# Patient Record
Sex: FEMALE | Race: BLACK OR AFRICAN AMERICAN | NOT HISPANIC OR LATINO | Employment: OTHER | URBAN - METROPOLITAN AREA
[De-identification: names, ages, dates, MRNs, and addresses within clinical notes are randomized per-mention and may not be internally consistent; named-entity substitution may affect disease eponyms.]

---

## 2017-07-03 ENCOUNTER — GENERIC CONVERSION - ENCOUNTER (OUTPATIENT)
Dept: OTHER | Facility: OTHER | Age: 72
End: 2017-07-03

## 2017-10-31 ENCOUNTER — ALLSCRIPTS OFFICE VISIT (OUTPATIENT)
Dept: OTHER | Facility: OTHER | Age: 72
End: 2017-10-31

## 2017-10-31 DIAGNOSIS — E03.9 HYPOTHYROIDISM: ICD-10-CM

## 2017-10-31 DIAGNOSIS — E55.9 VITAMIN D DEFICIENCY: ICD-10-CM

## 2017-10-31 DIAGNOSIS — E28.39 OTHER PRIMARY OVARIAN FAILURE: ICD-10-CM

## 2017-11-01 NOTE — PROGRESS NOTES
Assessment  1  Hypothyroidism in adult (244 9) (E03 9)   2  Vitamin D deficiency (268 9) (E55 9)    Plan  Encounter for screening for osteoporosis    · * DXA BONE DENSITY SPINE HIP AND PELVIS; Status:Complete;   Done: [id number]  12:00AM  Hypothyroidism in adult    · (1) COMPREHENSIVE METABOLIC PANEL; Status:Active; Requested for:31Oct2017;    · (1) TSH; Status:Active; Requested for:31Oct2017;    · Follow-up visit in 6 months Evaluation and Treatment  Follow-up  Status: Hold For -  Scheduling  Requested for: 31Oct2017  Menopause ovarian failure    · * DXA BONE DENSITY SPINE HIP AND PELVIS; Status:Hold For - Scheduling; Requested  for:31Oct2017;   Visit for screening mammogram    · * MAMMO SCREENING BILATERAL W CAD; Status:Complete;   Done: [id number]  12:00AM  Vitamin D deficiency    · Vitamin D 2000 UNIT Oral Capsule; take 1 capsule daily   · (1) VITAMIN D 25-HYDROXY; Status:Active; Requested for:31Oct2017;     Discussion/Summary  Discussion Summary:   Continue current medicationsobtain old records  Counseling Documentation With Imm: The patient was counseled regarding diagnostic results,-- instructions for management,-- prognosis  Chief Complaint  Chief Complaint Free Text Note Form: Pt  in office today for a new patient visit  Pt would like a blood work order  History of Present Illness  HPI: patient seen today as a new patient, she is taking synthroid, without any problems  Vitamin D Deficiency: Disease type: vitamin D deficiency  Current treatment includes vitamin D3 (cholecalciferol)  The patient is currently asymptomatic  Review of Systems  Complete-Female:   Constitutional: No fever, no chills, feels well, no tiredness, no recent weight gain or weight loss  Eyes: No complaints of eye pain, no red eyes, no eyesight problems, no discharge, no dry eyes, no itching of eyes  ENT: no complaints of earache, no loss of hearing, no nose bleeds, no nasal discharge, no sore throat, no hoarseness  Cardiovascular: No complaints of slow heart rate, no fast heart rate, no chest pain, no palpitations, no leg claudication, no lower extremity edema  Respiratory: No complaints of shortness of breath, no wheezing, no cough, no SOB on exertion, no orthopnea, no PND  Gastrointestinal: No complaints of abdominal pain, no constipation, no nausea or vomiting, no diarrhea, no bloody stools  Genitourinary: No complaints of dysuria, no incontinence, no pelvic pain, no dysmenorrhea, no vaginal discharge or bleeding  Musculoskeletal: No complaints of arthralgias, no myalgias, no joint swelling or stiffness, no limb pain or swelling  Integumentary: No complaints of skin rash or lesions, no itching, no skin wounds, no breast pain or lump  Neurological: No complaints of headache, no confusion, no convulsions, no numbness, no dizziness or fainting, no tingling, no limb weakness, no difficulty walking  Psychiatric: Not suicidal, no sleep disturbance, no anxiety or depression, no change in personality, no emotional problems  Endocrine: No complaints of proptosis, no hot flashes, no muscle weakness, no deepening of the voice, no feelings of weakness  Hematologic/Lymphatic: No complaints of swollen glands, no swollen glands in the neck, does not bleed easily, does not bruise easily  Active Problems  1  Encounter for screening for osteoporosis (V82 81) (Z58 977)   2  Visit for screening mammogram (V76 12) (Z12 31)    Surgical History  1  History of Gallbladder Surgery    Family History  Mother    1  Family history of cerebrovascular accident (CVA) (V17 1) (Z82 3)   2  Family history of diabetes mellitus (V18 0) (Z83 3)  Father    3  Family history of cardiac disorder (V17 49) (Z82 49)  Sister    4  Family history of malignant neoplasm (V16 9) (Z80 9)   5  Denied: Family history of mental disorder   6  Denied: Family history of substance abuse   7   Family history of thyroid disease (V18 19) (Z83 49)  Grandfather 8  Denied: Family history of mental disorder   9  Denied: Family history of substance abuse   10  Family history of thyroid disease (V18 19) (Z83 49)    Social History   · Always uses seat belt   · Does not use caffeine (V15 89) (Z91 89)   · Does not use illicit drugs (J62 12) (Z78 9)   · Exercises regularly   · Lives with spouse   ·    · Never a smoker   · No alcohol use   · Primary spoken language English   · Retired    Current Meds   1  Elidel 1 % External Cream;   Therapy: (Recorded:31Oct2017) to Recorded   2  Levocetirizine Dihydrochloride 5 MG Oral Tablet; Therapy: (Recorded:31Oct2017) to Recorded   3  Synthroid 88 MCG Oral Tablet; Therapy: (Recorded:31Oct2017) to Recorded   4  Vitamin D3 CAPS; Therapy: (Recorded:31Oct2017) to Recorded    Allergies  1  Levaquin TABS    Vitals  Vital Signs    Recorded: [id number] 02:09PM   Heart Rate 95   Systolic 460   Diastolic 90   Height 5 ft 4 in   Weight 235 lb    BMI Calculated 40 34   BSA Calculated 2 09   O2 Saturation 97     Physical Exam    Constitutional   General appearance: No acute distress, well appearing and well nourished  Ears, Nose, Mouth, and Throat   Oropharynx: Normal with no erythema, edema, exudate or lesions  Pulmonary   Auscultation of lungs: Clear to auscultation  Cardiovascular   Auscultation of heart: Normal rate and rhythm, normal S1 and S2, without murmurs  Examination of extremities for edema and/or varicosities: Normal     Abdomen   Abdomen: Non-tender, no masses  Musculoskeletal   Gait and station: Normal     Neurologic   Cranial nerves: Cranial nerves 2-12 intact      Psychiatric   Orientation to person, place, and time: Normal     Mood and affect: Normal          Results/Data  * MAMMO SCREENING BILATERAL W CAD 19Oct2017 12:00AM Shawn Arevalo     Test Name Result Flag Reference   MAMMO SCREENING BILATERAL W CAD 10/19/2017       Summary / No summary entered :      No summary entered  Documents attached : sMammo - Wanda Marcelo; Enc: [id number] - Appointment - Wanda Marcelo - (Family      Medicine) (Result Document)  * DXA BONE DENSITY SPINE HIP AND PELVIS [id number] 12:00AM Wanda Marcelo     Test Name Result Flag Reference   DXA BONE DENSITY SPINE HIP AND PELVIS 07/20/2015       Summary / No summary entered :      No summary entered  Documents attached :      Elyse Cai; Enc: [id number] - Appointment - Wanda Marcelo - (Family Medicine)      (Result Document)    Future Appointments    Date/Time Provider Specialty Site   11/13/2017 01:00 PM REGGIE Arvizu   Bariatric Medicine [facility]     Signatures   Electronically signed by : Kelly Valle DO; Oct 31 2017  2:32PM EST                       (Author)

## 2017-11-07 ENCOUNTER — LAB CONVERSION - ENCOUNTER (OUTPATIENT)
Dept: OTHER | Facility: OTHER | Age: 72
End: 2017-11-07

## 2017-11-07 ENCOUNTER — GENERIC CONVERSION - ENCOUNTER (OUTPATIENT)
Dept: OTHER | Facility: OTHER | Age: 72
End: 2017-11-07

## 2017-11-07 LAB
25(OH)D3 SERPL-MCNC: 32 NG/ML (ref 30–100)
A/G RATIO (HISTORICAL): 1.2 (CALC) (ref 1–2.5)
ALBUMIN SERPL BCP-MCNC: 3.9 G/DL (ref 3.6–5.1)
ALP SERPL-CCNC: 84 U/L (ref 33–130)
ALT SERPL W P-5'-P-CCNC: 12 U/L (ref 6–29)
AST SERPL W P-5'-P-CCNC: 13 U/L (ref 10–35)
BILIRUB SERPL-MCNC: 0.4 MG/DL (ref 0.2–1.2)
BUN SERPL-MCNC: 14 MG/DL (ref 7–25)
BUN/CREA RATIO (HISTORICAL): NORMAL (CALC) (ref 6–22)
CALCIUM SERPL-MCNC: 9.1 MG/DL (ref 8.6–10.4)
CHLORIDE SERPL-SCNC: 105 MMOL/L (ref 98–110)
CO2 SERPL-SCNC: 26 MMOL/L (ref 20–31)
CREAT SERPL-MCNC: 0.81 MG/DL (ref 0.6–0.93)
EGFR AFRICAN AMERICAN (HISTORICAL): 85 ML/MIN/1.73M2
EGFR-AMERICAN CALC (HISTORICAL): 74 ML/MIN/1.73M2
GAMMA GLOBULIN (HISTORICAL): 3.3 G/DL (CALC) (ref 1.9–3.7)
GLUCOSE (HISTORICAL): 94 MG/DL (ref 65–99)
POTASSIUM SERPL-SCNC: 4.3 MMOL/L (ref 3.5–5.3)
SODIUM SERPL-SCNC: 138 MMOL/L (ref 135–146)
TOTAL PROTEIN (HISTORICAL): 7.2 G/DL (ref 6.1–8.1)
TSH SERPL DL<=0.05 MIU/L-ACNC: 0.93 MIU/L (ref 0.4–4.5)

## 2017-12-15 ENCOUNTER — ALLSCRIPTS OFFICE VISIT (OUTPATIENT)
Dept: OTHER | Facility: OTHER | Age: 72
End: 2017-12-15

## 2018-01-14 VITALS
OXYGEN SATURATION: 97 % | HEIGHT: 64 IN | HEART RATE: 95 BPM | DIASTOLIC BLOOD PRESSURE: 90 MMHG | WEIGHT: 235 LBS | BODY MASS INDEX: 40.12 KG/M2 | SYSTOLIC BLOOD PRESSURE: 140 MMHG

## 2018-01-16 NOTE — RESULT NOTES
Verified Results  (1) COMPREHENSIVE METABOLIC PANEL [id number] 67:41CD Ben Dove     Test Name Result Flag Reference   GLUCOSE 94 mg/dL  65-99   Fasting reference interval   UREA NITROGEN (BUN) 14 mg/dL  7-25   CREATININE 0 81 mg/dL  0 60-0 93   For patients >52years of age, the reference limit  for Creatinine is approximately 13% higher for people  identified as -American  eGFR NON-AFR  AMERICAN 74 mL/min/1 73m2  > OR = 60   eGFR AFRICAN AMERICAN 85 mL/min/1 73m2  > OR = 60   BUN/CREATININE RATIO   1-53   NOT APPLICABLE (calc)   SODIUM 138 mmol/L  135-146   POTASSIUM 4 3 mmol/L  3 5-5 3   CHLORIDE 105 mmol/L     CARBON DIOXIDE 26 mmol/L  20-31   CALCIUM 9 1 mg/dL  8 6-10 4   PROTEIN, TOTAL 7 2 g/dL  6 1-8 1   ALBUMIN 3 9 g/dL  3 6-5 1   GLOBULIN 3 3 g/dL (calc)  1 9-3 7   ALBUMIN/GLOBULIN RATIO 1 2 (calc)  1 0-2 5   BILIRUBIN, TOTAL 0 4 mg/dL  0 2-1 2   ALKALINE PHOSPHATASE 84 U/L     AST 13 U/L  10-35   ALT 12 U/L  6-29     *(Q) VITAMIN D, 25-HYDROXY, LC/MS/MS [id number] 11:57AM Russ Solitario   REPORT COMMENT:  FASTING:YES     Test Name Result Flag Reference   VITAMIN D, 25-OH, TOTAL 32 ng/mL     Vitamin D Status         25-OH Vitamin D:     Deficiency:                    <20 ng/mL  Insufficiency:             20 - 29 ng/mL  Optimal:                 > or = 30 ng/mL     For 25-OH Vitamin D testing on patients on   D2-supplementation and patients for whom quantitation   of D2 and D3 fractions is required, the QuestAssureD(TM)  25-OH VIT D, (D2,D3), LC/MS/MS is recommended: order   code 41120 (patients >2yrs)  For more information on this test, go to:  [email or web address]/faq/QEZ176  (This link is being provided for   informational/educational purposes only )     (Q) TSH, 3RD GENERATION [id number] 11:57AM Ben Dove     Test Name Result Flag Reference   TSH 0 93 mIU/L  0 40-4 50

## 2018-01-23 VITALS
HEART RATE: 72 BPM | RESPIRATION RATE: 16 BRPM | HEIGHT: 65 IN | BODY MASS INDEX: 38.87 KG/M2 | TEMPERATURE: 98.9 F | DIASTOLIC BLOOD PRESSURE: 92 MMHG | SYSTOLIC BLOOD PRESSURE: 150 MMHG | WEIGHT: 233.3 LBS

## 2018-01-23 NOTE — CONSULTS
Chief Complaint  Chief Complaint Free Text Note Form: New patient here for MWM consult; Francisco Rasmussen 5/8  History of Present Illness  Free Text HPI: Obesity-  Severity: Severe  Onset: menopause  Modifiers: depression (has denied tx in the past, she reports that it seems to be improving and denies SI/HI)  Was able to lose weight in the past with Renelda Cancel, but gained the weight back after her son passed away and her  had health issues  Had also been successful with [employer], [place] weight loss, and [employer]  Also joined [employer] support  Associated Symptoms: affects walking and joints, self image  Goals: below 200 lbs, but more specifically weigh 160 lbs    Seldom drinks water  Drinks diluted hanson's grape fruit (1 glass)  Drinks 1-2 cups decaf coffee with sweet and low and half and half  B: skips for the most part or batista, eggs, grits, and biscuits or cereal (skim milk)  S: fruit (bananas or grapes), sometimes fritos  L: skips  S: sometimes has miniature candy   D: vegetable/salad + macaroni/coleslaw + starch + protein  S: pecan Jordanian or ice cream       Past Medical History    1  History of Colon cancer screening (V76 51) (Z12 11)   2  History of Encounter for screening for osteoporosis (V82 81) (Z13 820)   3  History of screening mammography (V15 89) (Z92 89)   4  History of Screening for genitourinary condition (V81 6) (Z13 89)  Active Problems And Past Medical History Reviewed: The active problems and past medical history were reviewed and updated today  Assessment    1  Severe obesity (BMI 35 0-39 9) (278 01) (E66 01)   2  Hypothyroidism in adult (244 9) (E03 9)   3  Vitamin D deficiency (268 9) (E55 9)   4  Elevated blood pressure reading (796 2) (R03 0)    Plan   1  (1) INSULIN, FASTING; Status:Active; Requested for:40Iaq8430;    2  (1) LIPID PANEL, FASTING; Status:Active;  Requested for:05Srk9758;     Discussion/Summary  Discussion Summary:   70 yo female w/hypothyroidism, vitamin-D deficiency, +STOPBANG, and morbid obesity here to pursue medical weight management to improve weight and health  Obesity class 3:  -discussed options of conservative vs YUDI program +/- meal replacement vs VLCD  -discussed the indications for bariatric surgery -- pt not interested  -discussed the role of weight loss medications  -initial focus of 5-10% weight loss over 3-6 mos for improved health  -screening labs: CMP and TSH within acceptable limits  Check fasting insulin and lipid panel   -reviewed recommended BMI of 23-30 for individuals age 72 and older    Elevated BP:   -reviewed the risk of untreated high blood pressure   -recommended the patient check her blood pressure when she gets home  If still elevated she should call her family doctor  Hypothyroidism:  -continue Synthroid as per prescribing provider  -reports that she has been experiencing brittle nails and hair loss  TSH within acceptable limits from November  Recommended she follow up with her PCP  Vitamin-D deficiency:  -within acceptable limits from November    +STOPBANG:  -reviewed the risks of untreated sleep apnea  -offered referral for sleep medicine consult, but patient declines  She would like to see if risks improve with weight loss  -reassess in 3-6 months  Soy product-patient reports she gets skin breakouts, but denies allergic reaction    The patient travels during the winter months  She is interested in healthy core and would like to be scheduled for start when she returns in March  For now she will start with a conservative program    Goals and Barriers: The patient has the current Goals:     Goals:  1  Food log www  [employer] <[email or web address]>  2  No sugary beverages  At least 64oz of water daily  3  Increase physical activity by 10 minutes daily  4  8614-5809 calories, see sample menu  5  no skipping meals  6  protein: 65-80 grams/day  Patient's Capacity to Self-Care: Patient is able to Self-Care     Understands and agrees with treatment plan: The treatment plan was reviewed with the patient/guardian  The patient/guardian understands and agrees with the treatment plan   Self Referrals:   Self Referrals: No      Review of Systems  Focused-Female:   Constitutional: no fever and no chills  ENT: no sore throat  Cardiovascular: no chest pain and no palpitations  Respiratory: no shortness of breath and no cough  Gastrointestinal: diarrhea and Denies GERD, but no abdominal pain, no nausea and no vomiting    The patient presents with complaints of constipation (occasionally alternates between constipation and diarrhea)  Genitourinary: no dysuria  Musculoskeletal: arthralgias  Integumentary: no rashes  Neurological: no headache  Other Symptoms: PSYCH: admits to depression, but seems to be improving  Denies SI/HI  Recommended f/u with PCP/counseling if sx persist/worsen  ROS Reviewed:   ROS reviewed  Active Problems    1  Hypothyroidism in adult (244 9) (E03 9)   2  Menopause ovarian failure (256 39) (E28 39)   3  Vitamin D deficiency (268 9) (E55 9)    Surgical History    1  History of Gallbladder Surgery  Surgical History Reviewed: The surgical history was reviewed and updated today  Family History  Mother    1  Family history of cerebrovascular accident (CVA) (V17 1) (Z82 3)   2  Family history of diabetes mellitus (V18 0) (Z83 3)  Father    3  Family history of cardiac disorder (V17 49) (Z82 49)  Sister    4  Family history of malignant neoplasm (V16 9) (Z80 9)   5  Denied: Family history of mental disorder   6  Denied: Family history of substance abuse   7  Family history of thyroid disease (V18 19) (Z83 49)   8  FHx: breast cancer (V16 3) (Z80 3)  Grandfather    9  Denied: Family history of mental disorder   10  Denied: Family history of substance abuse   11  Family history of thyroid disease (V18 19) (Z83 49)  Family History Reviewed: The family history was reviewed and updated today  Social History    · Always uses seat belt   · Does not use caffeine (V15 89) (Z91 89)   · Does not use illicit drugs (Q88 29) (Z78 9)   · Exercises regularly   · Lives with spouse   ·    · Never a smoker   · No alcohol use   · Primary spoken language English   · Retired  Social History Reviewed: The social history was reviewed and updated today  Current Meds   1  Elidel 1 % External Cream;   Therapy: (Recorded:31Oct2017) to Recorded   2  Levocetirizine Dihydrochloride 5 MG Oral Tablet; Therapy: (Recorded:31Oct2017) to Recorded   3  Synthroid 88 MCG Oral Tablet; Therapy: (Recorded:31Oct2017) to Recorded   4  Vitamin D 2000 UNIT Oral Capsule; take 1 capsule daily; Therapy: [id number] to (Last Rx:31Oct2017) Ordered  Medication List Reviewed: The medication list was reviewed and updated today  Allergies    1  iodine   2  Levaquin TABS    3  Shellfish   4  Soy    Vitals  Vital Signs    Recorded: 15Dec2017 11:05AM Recorded: 15Dec2017 10:11AM   Temperature  98 9 F   Heart Rate  72   Respiration  16   Systolic 767 244   Diastolic 92 84   Height  5 ft 4 5 in   Weight  233 lb 4 8 oz   BMI Calculated  39 43   BSA Calculated  2 1   Waist Circumference  45 75   Neck Circumference  15     Physical Exam    Constitutional   General appearance: Abnormal   well developed and obese  Eyes No conjunctival pallor  Ears, Nose, Mouth, and Throat Oral mucosa moist    Pulmonary   Respiratory effort: No increased work of breathing or signs of respiratory distress  Auscultation of lungs: Clear to auscultation  Cardiovascular   Auscultation of heart: Normal rate and rhythm, normal S1 and S2, without murmurs  Examination of extremities for edema and/or varicosities: Normal   no edema  Abdomen   Abdomen: Abnormal   The abdomen was obese  Bowel sounds were normal  The abdomen was soft and nontender     Musculoskeletal   Gait and station: Normal     Psychiatric   Orientation to person, place, and time: Normal          Future Appointments    Date/Time Provider Specialty Site   03/15/2018 11:00 AM Juan Manuel Herbertweg  [facility]   05/01/2018 02:00 PM Tonio Shaver  Family Medicine [address]     Signatures   Electronically signed by : Merrily Lefort, [facility]; Dec 15 2017 12:35PM EST                       (Author)    Electronically signed by : REGGIE Olvera ; Dec 18 2017  7:32AM EST                       (Co-author)

## 2018-03-08 DIAGNOSIS — E03.9 ADULT HYPOTHYROIDISM: Primary | ICD-10-CM

## 2018-03-08 RX ORDER — LEVOTHYROXINE SODIUM 88 UG/1
88 TABLET ORAL DAILY
Qty: 90 TABLET | Refills: 0 | Status: SHIPPED | OUTPATIENT
Start: 2018-03-08 | End: 2018-06-11 | Stop reason: SDUPTHER

## 2018-03-08 RX ORDER — LEVOTHYROXINE SODIUM 88 UG/1
TABLET ORAL
COMMUNITY
End: 2018-03-08 | Stop reason: SDUPTHER

## 2018-03-20 ENCOUNTER — OFFICE VISIT (OUTPATIENT)
Dept: FAMILY MEDICINE CLINIC | Facility: CLINIC | Age: 73
End: 2018-03-20
Payer: MEDICARE

## 2018-03-20 VITALS
SYSTOLIC BLOOD PRESSURE: 136 MMHG | OXYGEN SATURATION: 97 % | BODY MASS INDEX: 36.8 KG/M2 | TEMPERATURE: 97.7 F | DIASTOLIC BLOOD PRESSURE: 82 MMHG | HEIGHT: 66 IN | HEART RATE: 66 BPM | WEIGHT: 229 LBS | RESPIRATION RATE: 15 BRPM

## 2018-03-20 DIAGNOSIS — J40 BRONCHITIS: Primary | ICD-10-CM

## 2018-03-20 PROBLEM — E55.9 VITAMIN D DEFICIENCY: Status: ACTIVE | Noted: 2017-10-31

## 2018-03-20 PROBLEM — E28.39 MENOPAUSE OVARIAN FAILURE: Status: ACTIVE | Noted: 2017-10-31

## 2018-03-20 PROBLEM — E03.9 HYPOTHYROIDISM IN ADULT: Status: ACTIVE | Noted: 2017-10-31

## 2018-03-20 PROCEDURE — 99213 OFFICE O/P EST LOW 20 MIN: CPT | Performed by: FAMILY MEDICINE

## 2018-03-20 RX ORDER — LEVOCETIRIZINE DIHYDROCHLORIDE 5 MG/1
TABLET, FILM COATED ORAL
COMMUNITY
End: 2018-11-12 | Stop reason: SDUPTHER

## 2018-03-20 RX ORDER — PIMECROLIMUS 10 MG/G
CREAM TOPICAL
COMMUNITY
End: 2018-05-01 | Stop reason: SDUPTHER

## 2018-03-20 RX ORDER — AZITHROMYCIN 250 MG/1
TABLET, FILM COATED ORAL
Qty: 6 TABLET | Refills: 0 | Status: SHIPPED | OUTPATIENT
Start: 2018-03-20 | End: 2018-03-24

## 2018-03-20 RX ORDER — LEVOTHYROXINE SODIUM 88 UG/1
TABLET ORAL
COMMUNITY
End: 2018-03-20

## 2018-03-20 NOTE — PROGRESS NOTES
Assessment/Plan:       Diagnoses and all orders for this visit:    Bronchitis  -     azithromycin (ZITHROMAX) 250 mg tablet; Take 2 tablets today then 1 tablet daily x 4 days    Other orders  -     Cholecalciferol 2000 units CAPS; Take 1 capsule by mouth  -     pimecrolimus (ELIDEL) 1 % cream; Apply topically  -     levocetirizine (XYZAL) 5 MG tablet; Take by mouth  -     Discontinue: levothyroxine (SYNTHROID) 88 mcg tablet; 1 TABLET DAILY              Subjective:      Patient ID: Ayush Terry is a 71 y o  female  Cough   This is a new problem  The current episode started 1 to 4 weeks ago  The problem has been gradually worsening  The problem occurs constantly  The cough is productive of sputum  Pertinent negatives include no chest pain, chills, ear pain, rhinorrhea or sore throat  She has tried OTC cough suppressant for the symptoms  The treatment provided no relief  The following portions of the patient's history were reviewed and updated as appropriate:   She has no past medical history on file  ,   does not have any pertinent problems on file  ,   has no past surgical history on file  ,  family history is not on file  ,   reports that she has never smoked  She has never used smokeless tobacco  She reports that she drinks alcohol  She reports that she does not use drugs  ,  is allergic to iodine; isoflavones; levofloxacin; and shellfish allergy     Current Outpatient Prescriptions   Medication Sig Dispense Refill    azithromycin (ZITHROMAX) 250 mg tablet Take 2 tablets today then 1 tablet daily x 4 days 6 tablet 0    Cholecalciferol 2000 units CAPS Take 1 capsule by mouth      levocetirizine (XYZAL) 5 MG tablet Take by mouth      levothyroxine (SYNTHROID) 88 mcg tablet Take 1 tablet (88 mcg total) by mouth daily 90 tablet 0    pimecrolimus (ELIDEL) 1 % cream Apply topically       No current facility-administered medications for this visit  Review of Systems   Constitutional: Negative for chills  HENT: Negative for ear pain, rhinorrhea, sinus pain and sore throat  Eyes: Negative for pain  Respiratory: Positive for cough  Cardiovascular: Negative for chest pain  Gastrointestinal: Negative for abdominal pain  Genitourinary: Negative for difficulty urinating  Objective:  Vitals:    03/20/18 1128   BP: 136/82   Pulse: 66   Resp: 15   Temp: 97 7 °F (36 5 °C)   SpO2: 97%   Weight: 104 kg (229 lb)   Height: 5' 6" (1 676 m)     Body mass index is 36 96 kg/m²  Physical Exam   Constitutional: She appears well-developed  HENT:   Head: Normocephalic  Nose: Mucosal edema, rhinorrhea and sinus tenderness present  Right sinus exhibits maxillary sinus tenderness and frontal sinus tenderness  Left sinus exhibits maxillary sinus tenderness and frontal sinus tenderness  Eyes: Conjunctivae are normal    Neck: Neck supple  Cardiovascular: Normal rate and regular rhythm  Pulmonary/Chest: No respiratory distress  She has wheezes  Abdominal: There is no tenderness  Lymphadenopathy:     She has no cervical adenopathy  Nursing note and vitals reviewed

## 2018-03-20 NOTE — PATIENT INSTRUCTIONS

## 2018-04-24 ENCOUNTER — HOSPITAL ENCOUNTER (OUTPATIENT)
Dept: BONE DENSITY | Facility: CLINIC | Age: 73
Discharge: HOME | End: 2018-04-24
Payer: MEDICARE

## 2018-04-24 DIAGNOSIS — E28.39 OTHER PRIMARY OVARIAN FAILURE: ICD-10-CM

## 2018-04-24 PROCEDURE — 77080 DXA BONE DENSITY AXIAL: CPT

## 2018-05-01 ENCOUNTER — OFFICE VISIT (OUTPATIENT)
Dept: FAMILY MEDICINE CLINIC | Facility: CLINIC | Age: 73
End: 2018-05-01
Payer: MEDICARE

## 2018-05-01 VITALS
HEART RATE: 69 BPM | RESPIRATION RATE: 16 BRPM | HEIGHT: 66 IN | WEIGHT: 231 LBS | BODY MASS INDEX: 37.12 KG/M2 | OXYGEN SATURATION: 94 % | TEMPERATURE: 98.5 F | DIASTOLIC BLOOD PRESSURE: 86 MMHG | SYSTOLIC BLOOD PRESSURE: 136 MMHG

## 2018-05-01 DIAGNOSIS — E55.9 VITAMIN D DEFICIENCY: ICD-10-CM

## 2018-05-01 DIAGNOSIS — E03.9 HYPOTHYROIDISM IN ADULT: Primary | ICD-10-CM

## 2018-05-01 DIAGNOSIS — L30.9 DERMATITIS: ICD-10-CM

## 2018-05-01 DIAGNOSIS — E78.2 MIXED HYPERLIPIDEMIA: ICD-10-CM

## 2018-05-01 PROCEDURE — 99214 OFFICE O/P EST MOD 30 MIN: CPT | Performed by: FAMILY MEDICINE

## 2018-05-01 RX ORDER — PIMECROLIMUS 10 MG/G
CREAM TOPICAL 2 TIMES DAILY
Qty: 30 G | Refills: 3 | Status: SHIPPED | OUTPATIENT
Start: 2018-05-01 | End: 2018-11-05 | Stop reason: SDUPTHER

## 2018-05-01 NOTE — PATIENT INSTRUCTIONS
Hyperlipidemia   WHAT YOU NEED TO KNOW:   What is hyperlipidemia? Hyperlipidemia is a high level of lipids (fats) in your blood  These lipids include cholesterol or triglycerides  Lipids are made by your body  They also come from the foods you eat  Your body needs lipids to work properly, but high levels increase your risk for heart disease, heart attack, and stroke  What increases my risk for hyperlipidemia? · Family history of high lipid levels    · Diet high in saturated fats, cholesterol, or calories     · High alcohol intake or smoking    · Lack of regular physical activity    · Medical conditions such as hypothyroidism, obesity, or type 2 diabetes    · Certain medicines, such as blood pressure medicines, hormones, and steroids  How is hyperlipidemia managed and treated? Your healthcare provider may first recommend that you make lifestyle changes to help decrease your lipid levels  You may also need to take medicine to lower your lipid levels  Some of the lifestyle changes you may need to make include the following:  · Maintain a healthy weight  Ask your healthcare provider how much you should weigh  Ask him or her to help you create a weight loss plan if you are overweight  Weight loss can decrease your cholesterol and triglyceride levels  · Exercise as directed  Exercise lowers your cholesterol levels and helps you maintain a healthy weight  Get 40 minutes or more of moderate exercise 3 to 4 days each week  You can split your exercise into four 10-minute workouts instead of 40 minutes at one time  Examples of moderate exercises include walking briskly, swimming, or riding a bike  Work with your healthcare provider to plan the best exercise program for you  · Do not smoke  Nicotine and other chemicals in cigarettes and cigars can increase your risk for a heart attack and stroke  Ask your healthcare provider for information if you currently smoke and need help to quit   E-cigarettes or smokeless tobacco still contain nicotine  Talk to your healthcare provider before you use these products  · Eat heart-healthy foods  Talk to your dietitian about a heart-healthy diet  The following will help you manage hyperlipidemia:     ¨ Decrease the total amount of fat you eat  Choose lean meats, fat-free or 1% fat milk, and low-fat dairy products, such as yogurt and cheese  Limit or do not eat red meat  Red meats are high in fat and cholesterol  ¨ Replace unhealthy fats with healthy fats  Unhealthy fats include saturated fat, trans fat, and cholesterol  Choose soft margarines that are low in saturated fat and have little or no trans fat  Monounsaturated fats are healthy fats  These are found in olive oil, canola oil, avocado, and nuts  Polyunsaturated fats are also healthy  These are found in fish, flaxseed, walnuts, and soybeans  ¨ Eat fruits and vegetables every day  They are low in calories and fat and a good source of essential vitamins  Include dark green, red, and orange vegetables  Examples include spinach, kale, broccoli, and carrots  ¨ Eat foods high in fiber  Choose whole grain, high-fiber foods  Good choices include whole-wheat breads or cereals, beans, peas, fruits, and vegetables  · Ask your healthcare provider if it is safe for you to drink alcohol  Alcohol can increase your cholesterol and triglyceride levels  A drink of alcohol is 12 ounces of beer, 5 ounces of wine, or 1½ ounces of liquor    Call 911 for any of the following:   · You have any of the following signs of a heart attack:      ¨ Squeezing, pressure, or pain in your chest that lasts longer than 5 minutes or returns    ¨ Discomfort or pain in your back, neck, jaw, stomach, or arm     ¨ Trouble breathing    ¨ Nausea or vomiting    ¨ Lightheadedness or a sudden cold sweat, especially with chest pain or trouble breathing    · You have any of the following signs of a stroke:      ¨ Numbness or drooping on one side of your face     ¨ Weakness in an arm or leg    ¨ Confusion or difficulty speaking    ¨ Dizziness, a severe headache, or vision loss  When should I contact my healthcare provider? · You have questions or concerns about your condition or care  CARE AGREEMENT:   You have the right to help plan your care  Learn about your health condition and how it may be treated  Discuss treatment options with your caregivers to decide what care you want to receive  You always have the right to refuse treatment  The above information is an  only  It is not intended as medical advice for individual conditions or treatments  Talk to your doctor, nurse or pharmacist before following any medical regimen to see if it is safe and effective for you  © 2017 [facility] Information is for End User's use only and may not be sold, redistributed or otherwise used for commercial purposes  All illustrations and images included in CareNotes® are the copyrighted property of [employer]  or [employer]

## 2018-05-01 NOTE — PROGRESS NOTES
Assessment/Plan:       Diagnoses and all orders for this visit:    Hypothyroidism in adult  -     TSH, 3rd generation; Future    Mixed hyperlipidemia  -     Cancel: Comprehensive metabolic panel; Future  -     CBC; Future  -     Lipid panel; Future  -     Comprehensive metabolic panel; Future    Vitamin D deficiency    Dermatitis  -     pimecrolimus (ELIDEL) 1 % cream; Apply topically 2 (two) times a day          Continue current medications, check a CMP and TSH now, and then a CBC CMP lipid profile in 6 months  We will see her back in 6 months time sooner she has any problems  Subjective:      Patient ID: Moshe Watkins is a 71 y o  female  Patient comes in today for a checkup  She is taking her levothyroxine for hypothyroidism, without any problems  No side effects  She had is concerned that her sugar may be elevated, although she has no signs or symptoms  She is concerned because she was reviewing her blood work and noted that her blood sugar was in the 90s  She is taking her vitamin D supplement without  problems, no side effects, no compliance issues  She does need refills of her Elidil for her dermatitis  The following portions of the patient's history were reviewed and updated as appropriate:   She has a past medical history of No known problems  ,   does not have any pertinent problems on file  ,   has a past surgical history that includes Gallbladder surgery  ,  family history includes Breast cancer in her sister; Cancer in her sister; Diabetes in her mother; Heart disease in her father; Stroke in her mother; Thyroid disease in her other and sister  ,   reports that she has never smoked  She has never used smokeless tobacco  She reports that she drinks alcohol  She reports that she does not use drugs  ,  is allergic to iodine; isoflavones; levofloxacin; and shellfish allergy     Current Outpatient Prescriptions   Medication Sig Dispense Refill    Cholecalciferol 2000 units CAPS Take 1 capsule by mouth      levocetirizine (XYZAL) 5 MG tablet Take by mouth      levothyroxine (SYNTHROID) 88 mcg tablet Take 1 tablet (88 mcg total) by mouth daily 90 tablet 0    pimecrolimus (ELIDEL) 1 % cream Apply topically 2 (two) times a day 30 g 3     No current facility-administered medications for this visit  Review of Systems   Constitutional: Negative for chills, fatigue and fever  HENT: Negative for congestion, ear pain, hearing loss, postnasal drip, rhinorrhea and sore throat  Eyes: Negative for pain and visual disturbance  Respiratory: Negative for chest tightness, shortness of breath and wheezing  Cardiovascular: Negative for chest pain and leg swelling  Gastrointestinal: Negative for abdominal distention, abdominal pain, constipation, diarrhea and vomiting  Endocrine: Negative for cold intolerance and heat intolerance  Genitourinary: Negative for difficulty urinating, frequency and urgency  Musculoskeletal: Negative for arthralgias and gait problem  Skin: Negative for color change  Neurological: Negative for dizziness, tremors, syncope, numbness and headaches  Hematological: Negative for adenopathy  Psychiatric/Behavioral: Negative for agitation, confusion and sleep disturbance  The patient is not nervous/anxious  Objective:  Vitals:    05/01/18 1407 05/01/18 1428   BP: 146/82 136/86   Pulse: 69    Resp: 16    Temp: 98 5 °F (36 9 °C)    SpO2: 94%    Weight: 105 kg (231 lb)    Height: 5' 6" (1 676 m)      Body mass index is 37 28 kg/m²  Physical Exam   Constitutional: She is oriented to person, place, and time  She appears well-developed and well-nourished  HENT:   Head: Normocephalic  Mouth/Throat: Oropharynx is clear and moist    Eyes: Conjunctivae are normal  No scleral icterus  Neck: Normal range of motion  No thyromegaly present  Cardiovascular: Normal rate, regular rhythm and normal heart sounds  No murmur heard    Pulmonary/Chest: Effort normal and breath sounds normal  No respiratory distress  She has no wheezes  Abdominal: Soft  Bowel sounds are normal  She exhibits no distension  There is no tenderness  Musculoskeletal: Normal range of motion  She exhibits no edema or tenderness  Lymphadenopathy:     She has no cervical adenopathy  Neurological: She is alert and oriented to person, place, and time  No cranial nerve deficit  Skin: Skin is warm and dry  No rash noted  No pallor  Psychiatric: She has a normal mood and affect  Her behavior is normal    Nursing note and vitals reviewed

## 2018-06-11 DIAGNOSIS — E03.9 ADULT HYPOTHYROIDISM: ICD-10-CM

## 2018-06-11 RX ORDER — LEVOTHYROXINE SODIUM 88 MCG
TABLET ORAL
Qty: 90 TABLET | Refills: 3 | Status: SHIPPED | OUTPATIENT
Start: 2018-06-11 | End: 2018-07-31

## 2018-07-14 LAB
ALBUMIN SERPL-MCNC: 4.1 G/DL (ref 3.6–5.1)
ALBUMIN/GLOB SERPL: 1.4 (CALC) (ref 1–2.5)
ALP SERPL-CCNC: 78 U/L (ref 33–130)
ALT SERPL-CCNC: 15 U/L (ref 6–29)
AST SERPL-CCNC: 17 U/L (ref 10–35)
BILIRUB SERPL-MCNC: 0.6 MG/DL (ref 0.2–1.2)
BUN SERPL-MCNC: 11 MG/DL (ref 7–25)
BUN/CREAT SERPL: NORMAL (CALC) (ref 6–22)
CALCIUM SERPL-MCNC: 9.3 MG/DL (ref 8.6–10.4)
CHLORIDE SERPL-SCNC: 107 MMOL/L (ref 98–110)
CO2 SERPL-SCNC: 26 MMOL/L (ref 20–31)
CREAT SERPL-MCNC: 0.82 MG/DL (ref 0.6–0.93)
GLOBULIN SER CALC-MCNC: 2.9 G/DL (CALC) (ref 1.9–3.7)
GLUCOSE SERPL-MCNC: 82 MG/DL (ref 65–99)
POTASSIUM SERPL-SCNC: 4.2 MMOL/L (ref 3.5–5.3)
PROT SERPL-MCNC: 7 G/DL (ref 6.1–8.1)
SL AMB EGFR AFRICAN AMERICAN: 83 ML/MIN/1.73M2
SL AMB EGFR NON AFRICAN AMERICAN: 72 ML/MIN/1.73M2
SODIUM SERPL-SCNC: 141 MMOL/L (ref 135–146)
TSH SERPL-ACNC: 0.05 MIU/L (ref 0.4–4.5)

## 2018-07-31 ENCOUNTER — TELEPHONE (OUTPATIENT)
Dept: FAMILY MEDICINE CLINIC | Facility: CLINIC | Age: 73
End: 2018-07-31

## 2018-07-31 DIAGNOSIS — E03.9 ACQUIRED HYPOTHYROIDISM: Primary | ICD-10-CM

## 2018-07-31 RX ORDER — LEVOTHYROXINE SODIUM 0.07 MG/1
75 TABLET ORAL DAILY
Qty: 30 TABLET | Refills: 5 | Status: SHIPPED | OUTPATIENT
Start: 2018-07-31 | End: 2019-01-24 | Stop reason: SDUPTHER

## 2018-10-29 ENCOUNTER — LAB (OUTPATIENT)
Dept: LAB | Facility: CLINIC | Age: 73
End: 2018-10-29
Payer: MEDICARE

## 2018-10-29 ENCOUNTER — TRANSCRIBE ORDERS (OUTPATIENT)
Dept: LAB | Facility: CLINIC | Age: 73
End: 2018-10-29

## 2018-10-29 DIAGNOSIS — E03.9 HYPOTHYROIDISM IN ADULT: ICD-10-CM

## 2018-10-29 DIAGNOSIS — E78.2 MIXED HYPERLIPIDEMIA: ICD-10-CM

## 2018-10-29 DIAGNOSIS — E55.9 VITAMIN D DEFICIENCY: ICD-10-CM

## 2018-10-29 LAB
25(OH)D3 SERPL-MCNC: 26.4 NG/ML (ref 30–100)
ALBUMIN SERPL BCP-MCNC: 3.4 G/DL (ref 3.5–5)
ALP SERPL-CCNC: 84 U/L (ref 46–116)
ALT SERPL W P-5'-P-CCNC: 21 U/L (ref 12–78)
ANION GAP SERPL CALCULATED.3IONS-SCNC: 5 MMOL/L (ref 4–13)
AST SERPL W P-5'-P-CCNC: 16 U/L (ref 5–45)
BILIRUB SERPL-MCNC: 0.55 MG/DL (ref 0.2–1)
BUN SERPL-MCNC: 12 MG/DL (ref 5–25)
CALCIUM SERPL-MCNC: 8.8 MG/DL (ref 8.3–10.1)
CHLORIDE SERPL-SCNC: 107 MMOL/L (ref 100–108)
CHOLEST SERPL-MCNC: 183 MG/DL (ref 50–200)
CO2 SERPL-SCNC: 25 MMOL/L (ref 21–32)
CREAT SERPL-MCNC: 0.85 MG/DL (ref 0.6–1.3)
ERYTHROCYTE [DISTWIDTH] IN BLOOD BY AUTOMATED COUNT: 14.6 % (ref 11.6–15.1)
GFR SERPL CREATININE-BSD FRML MDRD: 80 ML/MIN/1.73SQ M
GLUCOSE P FAST SERPL-MCNC: 82 MG/DL (ref 65–99)
HCT VFR BLD AUTO: 42 % (ref 34.8–46.1)
HDLC SERPL-MCNC: 42 MG/DL (ref 40–60)
HGB BLD-MCNC: 13.4 G/DL (ref 11.5–15.4)
LDLC SERPL CALC-MCNC: 122 MG/DL (ref 0–100)
MCH RBC QN AUTO: 27.5 PG (ref 26.8–34.3)
MCHC RBC AUTO-ENTMCNC: 31.9 G/DL (ref 31.4–37.4)
MCV RBC AUTO: 86 FL (ref 82–98)
NONHDLC SERPL-MCNC: 141 MG/DL
PLATELET # BLD AUTO: 289 THOUSANDS/UL (ref 149–390)
PMV BLD AUTO: 9.8 FL (ref 8.9–12.7)
POTASSIUM SERPL-SCNC: 3.8 MMOL/L (ref 3.5–5.3)
PROT SERPL-MCNC: 7.4 G/DL (ref 6.4–8.2)
RBC # BLD AUTO: 4.87 MILLION/UL (ref 3.81–5.12)
SODIUM SERPL-SCNC: 137 MMOL/L (ref 136–145)
TRIGL SERPL-MCNC: 96 MG/DL
TSH SERPL DL<=0.05 MIU/L-ACNC: 2.55 UIU/ML (ref 0.36–3.74)
WBC # BLD AUTO: 6.5 THOUSAND/UL (ref 4.31–10.16)

## 2018-10-29 PROCEDURE — 80061 LIPID PANEL: CPT

## 2018-10-29 PROCEDURE — 36415 COLL VENOUS BLD VENIPUNCTURE: CPT

## 2018-10-29 PROCEDURE — 84443 ASSAY THYROID STIM HORMONE: CPT

## 2018-10-29 PROCEDURE — 85027 COMPLETE CBC AUTOMATED: CPT

## 2018-10-29 PROCEDURE — 82306 VITAMIN D 25 HYDROXY: CPT

## 2018-10-29 PROCEDURE — 80053 COMPREHEN METABOLIC PANEL: CPT

## 2018-11-05 ENCOUNTER — OFFICE VISIT (OUTPATIENT)
Dept: FAMILY MEDICINE CLINIC | Facility: CLINIC | Age: 73
End: 2018-11-05
Payer: MEDICARE

## 2018-11-05 VITALS
OXYGEN SATURATION: 95 % | HEART RATE: 84 BPM | DIASTOLIC BLOOD PRESSURE: 72 MMHG | SYSTOLIC BLOOD PRESSURE: 138 MMHG | BODY MASS INDEX: 34.87 KG/M2 | TEMPERATURE: 97.7 F | WEIGHT: 217 LBS | HEIGHT: 66 IN

## 2018-11-05 DIAGNOSIS — E78.2 MIXED HYPERLIPIDEMIA: ICD-10-CM

## 2018-11-05 DIAGNOSIS — Z12.31 SCREENING MAMMOGRAM, ENCOUNTER FOR: ICD-10-CM

## 2018-11-05 DIAGNOSIS — E03.9 HYPOTHYROIDISM IN ADULT: Primary | ICD-10-CM

## 2018-11-05 DIAGNOSIS — Z23 NEED FOR VACCINATION: ICD-10-CM

## 2018-11-05 DIAGNOSIS — Z00.00 HEALTH CARE MAINTENANCE: ICD-10-CM

## 2018-11-05 DIAGNOSIS — L30.9 DERMATITIS: ICD-10-CM

## 2018-11-05 PROBLEM — E28.39 MENOPAUSE OVARIAN FAILURE: Status: RESOLVED | Noted: 2017-10-31 | Resolved: 2018-11-05

## 2018-11-05 PROCEDURE — 99213 OFFICE O/P EST LOW 20 MIN: CPT | Performed by: FAMILY MEDICINE

## 2018-11-05 PROCEDURE — G0439 PPPS, SUBSEQ VISIT: HCPCS | Performed by: FAMILY MEDICINE

## 2018-11-05 RX ORDER — PIMECROLIMUS 10 MG/G
CREAM TOPICAL 2 TIMES DAILY
Qty: 30 G | Refills: 1 | Status: SHIPPED | OUTPATIENT
Start: 2018-11-05 | End: 2020-06-27 | Stop reason: SDUPTHER

## 2018-11-05 NOTE — PATIENT INSTRUCTIONS
Obesity   AMBULATORY CARE:   Obesity  is when your body mass index (BMI) is greater than 30  Your healthcare provider will use your height and weight to measure your BMI  The risks of obesity include  many health problems, such as injuries or physical disability  You may need tests to check for the following:  · Diabetes     · High blood pressure or high cholesterol     · Heart disease     · Gallbladder or liver disease     · Cancer of the colon, breast, prostate, liver, or kidney     · Sleep apnea     · Arthritis or gout  Seek care immediately if:   · You have a severe headache, confusion, or difficulty speaking  · You have weakness on one side of your body  · You have chest pain, sweating, or shortness of breath  Contact your healthcare provider if:   · You have symptoms of gallbladder or liver disease, such as pain in your upper abdomen  · You have knee or hip pain and discomfort while walking  · You have symptoms of diabetes, such as intense hunger and thirst, and frequent urination  · You have symptoms of sleep apnea, such as snoring or daytime sleepiness  · You have questions or concerns about your condition or care  Treatment for obesity  focuses on helping you lose weight to improve your health  Even a small decrease in BMI can reduce the risk for many health problems  Your healthcare provider will help you set a weight-loss goal   · Lifestyle changes  are the first step in treating obesity  These include making healthy food choices and getting regular physical activity  Your healthcare provider may suggest a weight-loss program that involves coaching, education, and therapy  · Medicine  may help you lose weight when it is used with a healthy diet and physical activity  · Surgery  can help you lose weight if you are very obese and have other health problems  There are several types of weight-loss surgery  Ask your healthcare provider for more information    Be successful losing weight:   · Set small, realistic goals  An example of a small goal is to walk for 20 minutes 5 days a week  Anther goal is to lose 5% of your body weight  · Tell friends, family members, and coworkers about your goals  and ask for their support  Ask a friend to lose weight with you, or join a weight-loss support group  · Identify foods or triggers that may cause you to overeat , and find ways to avoid them  Remove tempting high-calorie foods from your home and workplace  Place a bowl of fresh fruit on your kitchen counter  If stress causes you to eat, then find other ways to cope with stress  · Keep a diary to track what you eat and drink  Also write down how many minutes of physical activity you do each day  Weigh yourself once a week and record it in your diary  Eating changes: You will need to eat 500 to 1,000 fewer calories each day than you currently eat to lose 1 to 2 pounds a week  The following changes will help you cut calories:  · Eat smaller portions  Use small plates, no larger than 9 inches in diameter  Fill your plate half full of fruits and vegetables  Measure your food using measuring cups until you know what a serving size looks like  · Eat 3 meals and 1 or 2 snacks each day  Plan your meals in advance  Mabel Figures and eat at home most of the time  Eat slowly  · Eat fruits and vegetables at every meal   They are low in calories and high in fiber, which makes you feel full  Do not add butter, margarine, or cream sauce to vegetables  Use herbs to season steamed vegetables  · Eat less fat and fewer fried foods  Eat more baked or grilled chicken and fish  These protein sources are lower in calories and fat than red meat  Limit fast food  Dress your salads with olive oil and vinegar instead of bottled dressing  · Limit the amount of sugar you eat  Do not drink sugary beverages  Limit alcohol  Activity changes:  Physical activity is good for your body in many ways   It helps you burn calories and build strong muscles  It decreases stress and depression, and improves your mood  It can also help you sleep better  Talk to your healthcare provider before you begin an exercise program   · Exercise for at least 30 minutes 5 days a week  Start slowly  Set aside time each day for physical activity that you enjoy and that is convenient for you  It is best to do both weight training and an activity that increases your heart rate, such as walking, bicycling, or swimming  · Find ways to be more active  Do yard work and housecleaning  Walk up the stairs instead of using elevators  Spend your leisure time going to events that require walking, such as outdoor festivals or fairs  This extra physical activity can help you lose weight and keep it off  Follow up with your healthcare provider as directed: You may need to meet with a dietitian  Write down your questions so you remember to ask them during your visits  © 2017 [employer] Information is for End User's use only and may not be sold, redistributed or otherwise used for commercial purposes  All illustrations and images included in CareNotes® are the copyrighted property of [employer]  or [employer]on[employer]  The above information is an  only  It is not intended as medical advice for individual conditions or treatments  Talk to your doctor, nurse or pharmacist before following any medical regimen to see if it is safe and effective for you  Urinary Incontinence   WHAT YOU NEED TO KNOW:   What is urinary incontinence? Urinary incontinence (UI) is when you lose control of your bladder  What causes UI? UI occurs because your bladder cannot store or empty urine properly  The following are the most common types of UI:  · Stress incontinence  is when you leak urine due to increased bladder pressure  This may happen when you cough, sneeze, or exercise       · Urge incontinence  is when you feel the need to urinate right away and leak urine accidentally  · Mixed incontinence  is when you have both stress and urge UI  What are the signs and symptoms of UI?   · You feel like your bladder does not empty completely when you urinate  · You urinate often and need to urinate immediately  · You leak urine when you sleep, or you wake up with the urge to urinate  · You leak urine when you cough, sneeze, exercise, or laugh  How is UI diagnosed? Your healthcare provider will ask how often you leak urine and whether you have stress or urge symptoms  Tell him which medicines you take, how often you urinate, and how much liquid you drink each day  You may need any of the following tests:  · Urine tests  may show infection or kidney function  · A pelvic exam  may be done to check for blockages  A pelvic exam will also show if your bladder, uterus, or other organs have moved out of place  · An x-ray, ultrasound, or CT  may show problems with parts of your urinary system  You may be given contrast liquid to help your organs show up better in the pictures  Tell the healthcare provider if you have ever had an allergic reaction to contrast liquid  Do not enter the MRI room with anything metal  Metal can cause serious injury  Tell the healthcare provider if you have any metal in or on your body  · A bladder scan  will show how much urine is left in your bladder after you urinate  You will be asked to urinate and then healthcare providers will use a small ultrasound machine to check the urine left in your bladder  · Cystometry  is used to check the function of your urinary system  Your healthcare provider checks the pressure in your bladder while filling it with fluid  Your bladder pressure may also be tested when your bladder is full and while you urinate  How is UI treated? · Medicines  can help strengthen your bladder control      · Electrical stimulation  is used to send a small amount of electrical energy to your pelvic floor muscles  This helps control your bladder function  Electrodes may be placed outside your body or in your rectum  For women, the electrodes may be placed in the vagina  · A bulking agent  may be injected into the wall of your urethra to make it thicker  This helps keep your urethra closed and decreases urine leakage  · Devices  such as a clamp, pessary, or tampon may help stop urine leaks  Ask your healthcare provider for more information about these and other devices  · Surgery  may be needed if other treatments do not work  Several types of surgery can help improve your bladder control  Ask your healthcare provider for more information about the surgery you may need  How can I manage my symptoms? · Do pelvic muscle exercises often  Your pelvic muscles help you stop urinating  Squeeze these muscles tight for 5 seconds, then relax for 5 seconds  Gradually work up to squeezing for 10 seconds  Do 3 sets of 15 repetitions a day, or as directed  This will help strengthen your pelvic muscles and improve bladder control  · A catheter  may be used to help empty your bladder  A catheter is a tiny, plastic tube that is put into your bladder to drain your urine  Your healthcare provider may tell you to use a catheter to prevent your bladder from getting too full and leaking urine  · Keep a UI record  Write down how often you leak urine and how much you leak  Make a note of what you were doing when you leaked urine  · Train your bladder  Go to the bathroom at set times, such as every 2 hours, even if you do not feel the urge to go  You can also try to hold your urine when you feel the urge to go  For example, hold your urine for 5 minutes when you feel the urge to go  As that becomes easier, hold your urine for 10 minutes  · Drink liquids as directed  Ask your healthcare provider how much liquid to drink each day and which liquids are best for you   You may need to limit the amount of liquid you drink to help control your urine leakage  Limit or do not have drinks that contain caffeine or alcohol  Do not drink any liquid right before you go to bed  · Prevent constipation  Eat a variety of high-fiber foods  Good examples are high-fiber cereals, beans, vegetables, and whole-grain breads  Prune juice may help make your bowel movement softer  Walking is the best way to trigger your intestines to have a bowel movement  · Exercise regularly and maintain a healthy weight  Ask your healthcare provider how much you should weigh and about the best exercise plan for you  Weight loss and exercise will decrease pressure on your bladder and help you control your leakage  Ask him to help you create a weight loss plan if you are overweight  When should I seek immediate care? · You have severe pain  · You are confused or cannot think clearly  When should I contact my healthcare provider? · You have a fever  · You see blood in your urine  · You have pain when you urinate  · You have new or worse pain, even after treatment  · Your mouth feels dry or you have vision changes  · Your urine is cloudy or smells bad  · You have questions or concerns about your condition or care  CARE AGREEMENT:   You have the right to help plan your care  Learn about your health condition and how it may be treated  Discuss treatment options with your caregivers to decide what care you want to receive  You always have the right to refuse treatment  The above information is an  only  It is not intended as medical advice for individual conditions or treatments  Talk to your doctor, nurse or pharmacist before following any medical regimen to see if it is safe and effective for you  © 2017 [employer] Information is for End User's use only and may not be sold, redistributed or otherwise used for commercial purposes   All illustrations and images included in CareNotes® are the copyrighted property of [employer]  or Alon[employer]  Cigarette Smoking and Your Health   AMBULATORY CARE:   Risks to your health if you smoke:  Nicotine and other chemicals found in tobacco damage every cell in your body  Even if you are a light smoker, you have an increased risk for cancer, heart disease, and lung disease  If you are pregnant or have diabetes, smoking increases your risk for complications  Benefits to your health if you stop smoking:   · You decrease respiratory symptoms such as coughing, wheezing, and shortness of breath  · You reduce your risk for cancers of the lung, mouth, throat, kidney, bladder, pancreas, stomach, and cervix  If you already have cancer, you increase the benefits of chemotherapy  You also reduce your risk for cancer returning or a second cancer from developing  · You reduce your risk for heart disease, blood clots, heart attack, and stroke  · You reduce your risk for lung infections, and diseases such as pneumonia, asthma, chronic bronchitis, and emphysema  · Your circulation improves  More oxygen can be delivered to your body  If you have diabetes, you lower your risk for complications, such as kidney, artery, and eye diseases  You also lower your risk for nerve damage  Nerve damage can lead to amputations, poor vision, and blindness  · You improve your body's ability to heal and to fight infections  Benefits to the health of others if you stop smoking:  Tobacco is harmful to nonsmokers who breathe in your secondhand smoke  The following are ways the health of others around you may improve when you stop smoking:  · You lower the risks for lung cancer and heart disease in nonsmoking adults  · If you are pregnant, you lower the risk for miscarriage, early delivery, low birth weight, and stillbirth  You also lower your baby's risk for SIDS, obesity, developmental delay, and neurobehavioral problems, such as ADHD  · If you have children, you lower their risk for ear infections, colds, pneumonia, bronchitis, and asthma  For more information and support to stop smoking:   · Smokefree  gov  Phone: 9- 307 - 399-0888  Web Address: www smokefree  gov  Follow up with your healthcare provider as directed:  Write down your questions so you remember to ask them during your visits  © 2017 [address] [employer] Information is for End User's use only and may not be sold, redistributed or otherwise used for commercial purposes  All illustrations and images included in CareNotes® are the copyrighted property of [employer]  or Alonzo Bertrand  The above information is an  only  It is not intended as medical advice for individual conditions or treatments  Talk to your doctor, nurse or pharmacist before following any medical regimen to see if it is safe and effective for you  Fall Prevention   AMBULATORY CARE:   Fall prevention  includes ways to make your home and other areas safer  It also includes ways you can move more carefully to prevent a fall  Health conditions that cause changes in your blood pressure, vision, or muscle strength and coordination may increase your risk for falls  Medicines may also increase your risk for falls if they make you dizzy, weak, or sleepy  Call 911 or have someone else call if:   · You have fallen and are unconscious  · You have fallen and cannot move part of your body  Contact your healthcare provider if:   · You have fallen and have pain or a headache  · You have questions or concerns about your condition or care  Fall prevention tips:   · Stand or sit up slowly  This may help you keep your balance and prevent falls  · Use assistive devices as directed  Your healthcare provider may suggest that you use a cane or walker to help you keep your balance  You may need to have grab bars put in your bathroom near the toilet or in the shower      · Wear shoes that fit well and have soles that   Wear shoes both inside and outside  Use slippers with good   Do not wear shoes with high heels  · Wear a personal alarm  This is a device that allows you to call 911 if you fall and need help  Ask your healthcare provider for more information  · Stay active  Exercise can help strengthen your muscles and improve your balance  Your healthcare provider may recommend water aerobics or walking  He or she may also recommend physical therapy to improve your coordination  Never start an exercise program without talking to your healthcare provider first      · Manage your medical conditions  Keep all appointments with your healthcare providers  Visit your eye doctor as directed  Home safety tips:   · Add items to prevent falls in the bathroom  Put nonslip strips on your bath or shower floor to prevent you from slipping  Use a bath mat if you do not have carpet in the bathroom  This will prevent you from falling when you step out of the bath or shower  Use a shower seat so you do not need to stand while you shower  Sit on the toilet or a chair in your bathroom to dry yourself and put on clothing  This will prevent you from losing your balance from drying or dressing yourself while you are standing  · Keep paths clear  Remove books, shoes, and other objects from walkways and stairs  Place cords for telephones and lamps out of the way so that you do not need to walk over them  Tape them down if you cannot move them  Remove small rugs  If you cannot remove a rug, secure it with double-sided tape  This will prevent you from tripping  · Install bright lights in your home  Use night lights to help light paths to the bathroom or kitchen  Always turn on the light before you start walking  · Keep items you use often on shelves within reach  Do not use a step stool to help you reach an item  · Paint or place reflective tape on the edges of your stairs    This will help you see the stairs better  Follow up with your healthcare provider as directed:  Write down your questions so you remember to ask them during your visits  © 2017 [employer] [employer] Information is for End User's use only and may not be sold, redistributed or otherwise used for commercial purposes  All illustrations and images included in CareNotes® are the copyrighted property of [employer]  or Alon[employer] Bert[employer]  The above information is an  only  It is not intended as medical advice for individual conditions or treatments  Talk to your doctor, nurse or pharmacist before following any medical regimen to see if it is safe and effective for you  Advance Directives   WHAT YOU NEED TO KNOW:   What are advance directives? Advance directives are legal documents that state your wishes and plans for medical care  These plans are made ahead of time in case you lose your ability to make decisions for yourself  Advance directives can apply to any medical decision, such as the treatments you want, and if you want to donate organs  What are the types of advance directives? There are many types of advance directives, and each state has rules about how to use them  You may choose a combination of any of the following:  · Living will: This is a written record of the treatment you want  You can also choose which treatments you do not want, which to limit, and which to stop at a certain time  This includes surgery, medicine, IV fluid, and tube feedings  · Durable power of  for healthcare [facility]): This is a written record that states who you want to make healthcare choices for you when you are unable to make them for yourself  This person, called a proxy, is usually a family member or a friend  You may choose more than 1 proxy  · Do not resuscitate (DNR) order:  A DNR order is used in case your heart stops beating or you stop breathing   It is a request not to have certain forms of treatment, such as CPR  A DNR order may be included in other types of advance directives  · Medical directive: This covers the care that you want if you are in a coma, near death, or unable to make decisions for yourself  You can list the treatments you want for each condition  Treatment may include pain medicine, surgery, blood transfusions, dialysis, IV or tube feedings, and a ventilator (breathing machine)  · Values history: This document has questions about your views, beliefs, and how you feel and think about life  This information can help others choose the care that you would choose  Why are advance directives important? An advance directive helps you control your care  Although spoken wishes may be used, it is better to have your wishes written down  Spoken wishes can be misunderstood, or not followed  Treatments may be given even if you do not want them  An advance directive may make it easier for your family to make difficult choices about your care  How do I decide what to put in my advance directives? · Make informed decisions:  Make sure you fully understand treatments or care you may receive  Think about the benefits and problems your decisions could cause for you or your family  Talk to healthcare providers if you have concerns or questions before you write down your wishes  You may also want to talk with your Church or , or a   Check your state laws to make sure that what you put in your advance directive is legal      · Sign all forms:  Sign and date your advance directive when you have finished  You may also need 2 witnesses to sign the forms  Witnesses cannot be your doctor or his staff, your spouse, heirs or beneficiaries, people you owe money to, or your chosen proxy  Talk to your family, proxy, and healthcare providers about your advance directive  Give each person a copy, and keep one for yourself in a place you can get to easily   Do not keep it hidden or locked away  · Review and revise your plans: You can revise your advance directive at any time, as long as you are able to make decisions  Review your plan every year, and when there are changes in your life, or your health  When you make changes, let your family, proxy, and healthcare providers know  Give each a new copy  Where can I find more information? · [employer]  [address] [place] , [address]  Phone: [phone]  Phone: [phone]  Web Address: http://www  aafp org  · [address] [facility])  [address] [address] [facility], [address] [facility] , [address] [place] [address]  Phone: [phone]  Phone: [phone]  Web Address: [facility] ambriz  CARE AGREEMENT:   You have the right to help plan your care  To help with this plan, you must learn about your health condition and treatment options  You must also learn about advance directives and how they are used  Work with your healthcare providers to decide what care will be used to treat you  You always have the right to refuse treatment  The above information is an  only  It is not intended as medical advice for individual conditions or treatments  Talk to your doctor, nurse or pharmacist before following any medical regimen to see if it is safe and effective for you  © 2017 [address] [employer] Information is for End User's use only and may not be sold, redistributed or otherwise used for commercial purposes  All illustrations and images included in CareNotes® are the copyrighted property of [employer]  or Alon[employer] Bertrand

## 2018-11-05 NOTE — PROGRESS NOTES
Assessment and Plan:    Problem List Items Addressed This Visit     Hypothyroidism in adult - Primary    Relevant Orders    TSH, 3rd generation    Mixed hyperlipidemia    Dermatitis    Relevant Medications    pimecrolimus (ELIDEL) 1 % cream      Other Visit Diagnoses     Health care maintenance        Screening mammogram, encounter for        Relevant Orders    Mammo screening bilateral w cad    Need for vaccination            Health Maintenance Due   Topic Date Due    Pneumococcal PPSV23/PCV13 65+ Years / High and Highest Risk (1 of 2 - PCV13) 11/24/2011    INFLUENZA VACCINE  07/01/2018         HPI:  Sofia Pepe is a 71 y o  female here for her Subsequent Wellness Visit  Patient Active Problem List   Diagnosis    Hypothyroidism in adult    Mixed hyperlipidemia    Vitamin D deficiency    Dermatitis     Past Medical History:   Diagnosis Date    No known problems      Past Surgical History:   Procedure Laterality Date    GALLBLADDER SURGERY       Family History   Problem Relation Age of Onset    Stroke Mother         Cerebrovascular Accident    Diabetes Mother         Mellitus    Heart disease Father         Cardiac Disorder    Cancer Sister     Thyroid disease Sister     Breast cancer Sister     Thyroid disease Other      History   Smoking Status    Never Smoker   Smokeless Tobacco    Never Used     History   Alcohol Use    Yes     Comment: Rare use; Per Allscript No Alcohol use      History   Drug Use No       Current Outpatient Prescriptions   Medication Sig Dispense Refill    Cholecalciferol 2000 units CAPS Take 1 capsule by mouth      levocetirizine (XYZAL) 5 MG tablet Take by mouth      levothyroxine 75 mcg tablet Take 1 tablet (75 mcg total) by mouth daily 30 tablet 5    pimecrolimus (ELIDEL) 1 % cream Apply topically 2 (two) times a day 30 g 1     No current facility-administered medications for this visit        Allergies   Allergen Reactions    Iodine Hives    Isoflavones     Levofloxacin     Shellfish Allergy Hives       There is no immunization history on file for this patient  Patient Care Team:  Blessing Arana DO as PCP - General  Dianne Flores PA-C    Medicare Screening Tests and Risk Assessments:  Cristofer Shi is here for her Subsequent Wellness visit  Last Medicare Wellness visit information reviewed, patient interviewed and updates made to the record today  Health Risk Assessment:  Patient rates overall health as very good  Patient feels that their physical health rating is Slightly better  Eyesight was rated as Same  Hearing was rated as Same  Patient feels that their emotional and mental health rating is Slightly better  Pain experienced by patient in the last 7 days has been None  Patient states that she has experienced no weight loss or gain in last 6 months  Emotional/Mental Health:  Patient has been feeling nervous/anxious  PHQ-9 Depression Screening:    Frequency of the following problems over the past two weeks:      1  Little interest or pleasure in doing things: 0 - not at all      2  Feeling down, depressed, or hopeless: 0 - not at all  PHQ-2 Score: 0          Broken Bones/Falls: Fall Risk Assessment:    In the past year, patient has experienced: No history of falling in past year          Bladder/Bowel:  Patient has not leaked urine accidently in the last six months  Patient reports no loss of bowel control  Immunizations:  Patient has not had a flu vaccination within the last year  Patient has not received a pneumonia shot  Patient has not received a shingles shot  Home Safety:  Patient does not have trouble with stairs inside or outside of their home  Patient currently reports that there are no safety hazards present in home, working smoke alarms, no working carbon monoxide detectors        Preventative Screenings:   Breast cancer screening performed, colon cancer screen completed, cholesterol screen completed, glaucoma eye exam completed, Nutrition:  Current diet: Regular with servings of the following:    Medications:  Patient is currently taking over-the-counter supplements  List of OTC medications includes: Vitamin D3  Patient is able to manage medications  Lifestyle Choices:  Patient reports no tobacco use  Patient has not smoked or used tobacco in the past   Patient reports no alcohol use  Patient drives a vehicle  Patient wears seat belt  Current level of exercise of physical activity described by patient as: cardio , antonio,  walking , weights (3x Week 30-hr  Activities of Daily Living:  Can get out of bed by his or her self, able to dress self, able to make own meals, able to do own shopping, able to bathe self, can do own laundry/housekeeping, can manage own money, pay bills and track expenses    Previous Hospitalizations:  No hospitalization or ED visit in past 12 months        Advanced Directives:  Patient has decided on a power of   Patient has spoken to designated power of   Patient has completed advanced directive  Preventative Screening/Counseling:      Cardiovascular:      General: Screening Current      Counseling: Improve Cholesterol, Healthy Weight and Healthy Diet          Diabetes:      General: Screening Current      Counseling: Healthy Diet          Colorectal Cancer:      General: Screening Current          Breast Cancer:      General: Screening Current          Cervical Cancer:      General: Screening Current          Osteoporosis:      General: Screening Current          AAA:      General: Screening Not Indicated          Glaucoma:      General: Screening Current          HIV:      General: Screening Not Indicated          Hepatitis C:      General: Screening Not Indicated        Advanced Directives:   Patient has living will for healthcare, has durable POA for healthcare, patient has an advanced directive       Immunizations:      Influenza: Risks & Benefits Discussed Pneumococcal: Risks & Benefits Discussed      Shingrix: Risks & Benefits Discussed

## 2018-11-05 NOTE — PROGRESS NOTES
Assessment/Plan:       Diagnoses and all orders for this visit:    Hypothyroidism in adult  -     TSH, 3rd generation; Future    Mixed hyperlipidemia    Dermatitis  -     pimecrolimus (ELIDEL) 1 % cream; Apply topically 2 (two) times a day    Health care maintenance    Screening mammogram, encounter for  -     Mammo screening bilateral w cad; Future    Need for vaccination        No problem-specific Assessment & Plan notes found for this encounter  continue current medications, we will see her back in 6 months time with a TSH done prior    Subjective:      Patient ID: Jim Brenner is a 71 y o  female  Patient comes in today for checkup on her hyperlipidemia, hypothyroidism, and dermatitis  She  her cholesterol with her diet, she states she is doing well with this  She is trying to lose weight  She is taking levothyroxine without any problems, no compliance issues  She did get her blood work done this was reviewed with her today  She is in need of refills for her Elidel, she states this works really well for her  The following portions of the patient's history were reviewed and updated as appropriate:   She has a past medical history of No known problems  ,   does not have any pertinent problems on file  ,   has a past surgical history that includes Gallbladder surgery  ,  family history includes Breast cancer in her sister; Cancer in her sister; Diabetes in her mother; Heart disease in her father; Stroke in her mother; Thyroid disease in her other and sister  ,   reports that she has never smoked  She has never used smokeless tobacco  She reports that she drinks alcohol  She reports that she does not use drugs  ,  is allergic to iodine; isoflavones; levofloxacin; and shellfish allergy     Current Outpatient Prescriptions   Medication Sig Dispense Refill    Cholecalciferol 2000 units CAPS Take 1 capsule by mouth      levocetirizine (XYZAL) 5 MG tablet Take by mouth      levothyroxine 75 mcg tablet Take 1 tablet (75 mcg total) by mouth daily 30 tablet 5    pimecrolimus (ELIDEL) 1 % cream Apply topically 2 (two) times a day 30 g 1     No current facility-administered medications for this visit  Review of Systems   Constitutional: Negative for chills, fatigue and fever  HENT: Negative for congestion, ear pain, hearing loss, postnasal drip, rhinorrhea and sore throat  Eyes: Negative for pain and visual disturbance  Respiratory: Negative for chest tightness, shortness of breath and wheezing  Cardiovascular: Negative for chest pain and leg swelling  Gastrointestinal: Negative for abdominal distention, abdominal pain, constipation, diarrhea and vomiting  Endocrine: Negative for cold intolerance and heat intolerance  Genitourinary: Negative for difficulty urinating, frequency and urgency  Musculoskeletal: Negative for arthralgias and gait problem  Skin: Negative for color change  Neurological: Negative for dizziness, tremors, syncope, numbness and headaches  Hematological: Negative for adenopathy  Psychiatric/Behavioral: Negative for agitation, confusion and sleep disturbance  The patient is not nervous/anxious  Objective:  Vitals:    11/05/18 0944   BP: 138/72   Pulse: 84   Temp: 97 7 °F (36 5 °C)   SpO2: 95%   Weight: 98 4 kg (217 lb)   Height: 5' 6" (1 676 m)     Body mass index is 35 02 kg/m²  Physical Exam   Constitutional: She is oriented to person, place, and time  She appears well-developed and well-nourished  HENT:   Head: Normocephalic  Mouth/Throat: Oropharynx is clear and moist    Eyes: Conjunctivae are normal  No scleral icterus  Neck: Normal range of motion  No thyromegaly present  Cardiovascular: Normal rate, regular rhythm and normal heart sounds  No murmur heard  Pulmonary/Chest: Effort normal and breath sounds normal  No respiratory distress  She has no wheezes  Abdominal: Soft   Bowel sounds are normal  She exhibits no distension  There is no tenderness  Musculoskeletal: Normal range of motion  She exhibits no edema or tenderness  Lymphadenopathy:     She has no cervical adenopathy  Neurological: She is alert and oriented to person, place, and time  No cranial nerve deficit  Skin: Skin is warm and dry  No rash noted  No pallor  Psychiatric: She has a normal mood and affect  Her behavior is normal    Nursing note and vitals reviewed

## 2018-11-12 DIAGNOSIS — J30.1 SEASONAL ALLERGIC RHINITIS DUE TO POLLEN: Primary | ICD-10-CM

## 2018-11-12 RX ORDER — LEVOCETIRIZINE DIHYDROCHLORIDE 5 MG/1
5 TABLET, FILM COATED ORAL EVERY EVENING
Qty: 30 TABLET | Refills: 5 | Status: SHIPPED | OUTPATIENT
Start: 2018-11-12 | End: 2020-12-28

## 2019-01-24 ENCOUNTER — TELEPHONE (OUTPATIENT)
Dept: FAMILY MEDICINE CLINIC | Facility: CLINIC | Age: 74
End: 2019-01-24

## 2019-01-24 DIAGNOSIS — E03.9 ACQUIRED HYPOTHYROIDISM: ICD-10-CM

## 2019-01-24 RX ORDER — LEVOTHYROXINE SODIUM 0.07 MG/1
75 TABLET ORAL DAILY
Qty: 30 TABLET | Refills: 5 | Status: SHIPPED | OUTPATIENT
Start: 2019-01-24 | End: 2019-05-06 | Stop reason: SDUPTHER

## 2019-04-23 LAB — TSH SERPL-ACNC: 2.37 MIU/L (ref 0.4–4.5)

## 2019-05-06 ENCOUNTER — OFFICE VISIT (OUTPATIENT)
Dept: FAMILY MEDICINE CLINIC | Facility: CLINIC | Age: 74
End: 2019-05-06
Payer: MEDICARE

## 2019-05-06 VITALS
DIASTOLIC BLOOD PRESSURE: 74 MMHG | HEIGHT: 66 IN | HEART RATE: 73 BPM | SYSTOLIC BLOOD PRESSURE: 128 MMHG | OXYGEN SATURATION: 97 % | BODY MASS INDEX: 34.87 KG/M2 | WEIGHT: 217 LBS

## 2019-05-06 DIAGNOSIS — E78.2 MIXED HYPERLIPIDEMIA: ICD-10-CM

## 2019-05-06 DIAGNOSIS — L02.91 ABSCESS: ICD-10-CM

## 2019-05-06 DIAGNOSIS — E03.9 HYPOTHYROIDISM IN ADULT: Primary | ICD-10-CM

## 2019-05-06 PROCEDURE — 99214 OFFICE O/P EST MOD 30 MIN: CPT | Performed by: FAMILY MEDICINE

## 2019-05-06 RX ORDER — DOXYCYCLINE HYCLATE 100 MG/1
100 CAPSULE ORAL EVERY 12 HOURS SCHEDULED
Qty: 14 CAPSULE | Refills: 0 | Status: SHIPPED | OUTPATIENT
Start: 2019-05-06 | End: 2019-05-13

## 2019-05-06 RX ORDER — LEVOTHYROXINE SODIUM 0.07 MG/1
75 TABLET ORAL DAILY
Qty: 90 TABLET | Refills: 3
Start: 2019-05-06 | End: 2019-05-28 | Stop reason: SDUPTHER

## 2019-05-28 ENCOUNTER — TELEPHONE (OUTPATIENT)
Dept: FAMILY MEDICINE CLINIC | Facility: CLINIC | Age: 74
End: 2019-05-28

## 2019-05-28 DIAGNOSIS — E03.9 HYPOTHYROIDISM IN ADULT: ICD-10-CM

## 2019-05-29 DIAGNOSIS — E03.9 HYPOTHYROIDISM IN ADULT: Primary | ICD-10-CM

## 2019-05-29 RX ORDER — LEVOTHYROXINE SODIUM 0.07 MG/1
75 TABLET ORAL DAILY
Qty: 90 TABLET | Refills: 3 | Status: SHIPPED | OUTPATIENT
Start: 2019-05-29 | End: 2019-05-29

## 2019-05-29 RX ORDER — LEVOTHYROXINE SODIUM 0.07 MG/1
75 TABLET ORAL DAILY
Qty: 90 TABLET | Refills: 3 | Status: SHIPPED | OUTPATIENT
Start: 2019-05-29 | End: 2020-05-18

## 2019-10-04 ENCOUNTER — HOSPITAL ENCOUNTER (EMERGENCY)
Facility: HOSPITAL | Age: 74
Discharge: HOME | End: 2019-10-04
Attending: EMERGENCY MEDICINE | Admitting: EMERGENCY MEDICINE
Payer: MEDICARE

## 2019-10-04 ENCOUNTER — TELEPHONE (OUTPATIENT)
Dept: FAMILY MEDICINE CLINIC | Facility: CLINIC | Age: 74
End: 2019-10-04

## 2019-10-04 VITALS
HEIGHT: 66 IN | OXYGEN SATURATION: 99 % | TEMPERATURE: 97.6 F | SYSTOLIC BLOOD PRESSURE: 145 MMHG | DIASTOLIC BLOOD PRESSURE: 74 MMHG | RESPIRATION RATE: 18 BRPM | HEART RATE: 55 BPM | BODY MASS INDEX: 35.52 KG/M2 | WEIGHT: 221 LBS

## 2019-10-04 DIAGNOSIS — R10.9 NONSPECIFIC ABDOMINAL PAIN: Primary | ICD-10-CM

## 2019-10-04 LAB
ALBUMIN SERPL BCP-MCNC: 3.9 G/DL (ref 3.5–5)
ALP SERPL-CCNC: 90 U/L (ref 46–116)
ALT SERPL W P-5'-P-CCNC: 20 U/L (ref 12–78)
ANION GAP SERPL CALCULATED.3IONS-SCNC: 8 MMOL/L (ref 4–13)
AST SERPL W P-5'-P-CCNC: 20 U/L (ref 5–45)
BACTERIA UR QL AUTO: ABNORMAL /HPF
BASOPHILS # BLD AUTO: 0.05 THOUSANDS/ΜL (ref 0–0.1)
BASOPHILS NFR BLD AUTO: 1 % (ref 0–1)
BILIRUB SERPL-MCNC: 0.3 MG/DL (ref 0.2–1)
BILIRUB UR QL STRIP: NEGATIVE
BUN SERPL-MCNC: 14 MG/DL (ref 5–25)
CALCIUM SERPL-MCNC: 9.2 MG/DL (ref 8.3–10.1)
CHLORIDE SERPL-SCNC: 105 MMOL/L (ref 100–108)
CLARITY UR: CLEAR
CO2 SERPL-SCNC: 28 MMOL/L (ref 21–32)
COLOR UR: YELLOW
CREAT SERPL-MCNC: 0.91 MG/DL (ref 0.6–1.3)
EOSINOPHIL # BLD AUTO: 0.15 THOUSAND/ΜL (ref 0–0.61)
EOSINOPHIL NFR BLD AUTO: 2 % (ref 0–6)
ERYTHROCYTE [DISTWIDTH] IN BLOOD BY AUTOMATED COUNT: 13.7 % (ref 11.6–15.1)
GFR SERPL CREATININE-BSD FRML MDRD: 73 ML/MIN/1.73SQ M
GLUCOSE SERPL-MCNC: 90 MG/DL (ref 65–140)
GLUCOSE UR STRIP-MCNC: NEGATIVE MG/DL
HCT VFR BLD AUTO: 43 % (ref 34.8–46.1)
HGB BLD-MCNC: 13.7 G/DL (ref 11.5–15.4)
HGB UR QL STRIP.AUTO: NEGATIVE
IMM GRANULOCYTES # BLD AUTO: 0.01 THOUSAND/UL (ref 0–0.2)
IMM GRANULOCYTES NFR BLD AUTO: 0 % (ref 0–2)
KETONES UR STRIP-MCNC: ABNORMAL MG/DL
LEUKOCYTE ESTERASE UR QL STRIP: ABNORMAL
LIPASE SERPL-CCNC: 107 U/L (ref 73–393)
LYMPHOCYTES # BLD AUTO: 3.1 THOUSANDS/ΜL (ref 0.6–4.47)
LYMPHOCYTES NFR BLD AUTO: 41 % (ref 14–44)
MCH RBC QN AUTO: 27.8 PG (ref 26.8–34.3)
MCHC RBC AUTO-ENTMCNC: 31.9 G/DL (ref 31.4–37.4)
MCV RBC AUTO: 87 FL (ref 82–98)
MONOCYTES # BLD AUTO: 0.5 THOUSAND/ΜL (ref 0.17–1.22)
MONOCYTES NFR BLD AUTO: 7 % (ref 4–12)
NEUTROPHILS # BLD AUTO: 3.76 THOUSANDS/ΜL (ref 1.85–7.62)
NEUTS SEG NFR BLD AUTO: 49 % (ref 43–75)
NITRITE UR QL STRIP: NEGATIVE
NON-SQ EPI CELLS URNS QL MICRO: ABNORMAL /HPF
NRBC BLD AUTO-RTO: 0 /100 WBCS
PH UR STRIP.AUTO: 5.5 [PH]
PLATELET # BLD AUTO: 260 THOUSANDS/UL (ref 149–390)
PMV BLD AUTO: 9.3 FL (ref 8.9–12.7)
POTASSIUM SERPL-SCNC: 3.8 MMOL/L (ref 3.5–5.3)
PROT SERPL-MCNC: 8.1 G/DL (ref 6.4–8.2)
PROT UR STRIP-MCNC: NEGATIVE MG/DL
RBC # BLD AUTO: 4.92 MILLION/UL (ref 3.81–5.12)
RBC #/AREA URNS AUTO: ABNORMAL /HPF
SODIUM SERPL-SCNC: 141 MMOL/L (ref 136–145)
SP GR UR STRIP.AUTO: >=1.03 (ref 1–1.03)
UROBILINOGEN UR QL STRIP.AUTO: 0.2 E.U./DL
WBC # BLD AUTO: 7.57 THOUSAND/UL (ref 4.31–10.16)
WBC #/AREA URNS AUTO: ABNORMAL /HPF

## 2019-10-04 PROCEDURE — 80053 COMPREHEN METABOLIC PANEL: CPT | Performed by: EMERGENCY MEDICINE

## 2019-10-04 PROCEDURE — 36415 COLL VENOUS BLD VENIPUNCTURE: CPT | Performed by: EMERGENCY MEDICINE

## 2019-10-04 PROCEDURE — 85025 COMPLETE CBC W/AUTO DIFF WBC: CPT | Performed by: EMERGENCY MEDICINE

## 2019-10-04 PROCEDURE — 83690 ASSAY OF LIPASE: CPT | Performed by: EMERGENCY MEDICINE

## 2019-10-04 PROCEDURE — 81001 URINALYSIS AUTO W/SCOPE: CPT

## 2019-10-04 PROCEDURE — 99284 EMERGENCY DEPT VISIT MOD MDM: CPT

## 2019-10-04 PROCEDURE — 99284 EMERGENCY DEPT VISIT MOD MDM: CPT | Performed by: PHYSICIAN ASSISTANT

## 2019-10-04 RX ORDER — LIDOCAINE HYDROCHLORIDE 20 MG/ML
15 SOLUTION OROPHARYNGEAL 4 TIMES DAILY PRN
Qty: 100 ML | Refills: 0 | Status: SHIPPED | OUTPATIENT
Start: 2019-10-04 | End: 2019-10-10

## 2019-10-04 RX ORDER — SUCRALFATE 1 G/1
1 TABLET ORAL 4 TIMES DAILY
Qty: 20 TABLET | Refills: 0 | Status: SHIPPED | OUTPATIENT
Start: 2019-10-04 | End: 2019-11-04

## 2019-10-04 RX ORDER — PANTOPRAZOLE SODIUM 20 MG/1
20 TABLET, DELAYED RELEASE ORAL DAILY
Qty: 30 TABLET | Refills: 0 | Status: SHIPPED | OUTPATIENT
Start: 2019-10-04 | End: 2019-10-24 | Stop reason: SDUPTHER

## 2019-10-05 NOTE — ED PROVIDER NOTES
History  Chief Complaint   Patient presents with    Flank Pain     Patient c/o right sided flank pain that she had last night after eating and again today after eating lunch  Patient states the pain does radiate around to her lower abdomen  Patient denies N/V  Patient has no history of kidney stone, but does have diverticulosis history  81-year-old female with left upper quadrant left flank pain after eating last night and again tonight  Started within an hour to after eating  Remained constant about an hour and has subsided  Now has no pain  All present it did radiate to the right upper quadrant/right flank  No nausea no vomiting no chest pain or shortness of breath  Denies any dysuria  No diarrhea  No new or unusual foods  Has history of cholecystectomy, otherwise no surgeries on the abdomen  No fevers or chills  She does know it was a particularly greasy spicy meal that caused her pain  History provided by:  Patient   used: No    Abdominal Pain   Pain location:  LUQ  Pain quality: aching    Pain radiates to:  RUQ  Pain severity:  Mild  Onset quality:  Gradual  Duration:  1 hour  Timing:  Intermittent  Progression:  Resolved  Chronicity:  New  Context: eating    Relieved by: Not eating or drinking  Worsened by:  Nothing  Ineffective treatments:  None tried  Associated symptoms: no anorexia, no belching, no chest pain, no chills, no constipation, no cough, no diarrhea, no dysuria, no fatigue, no fever, no flatus, no hematemesis, no hematochezia, no hematuria, no melena, no nausea, no shortness of breath, no sore throat, no vaginal bleeding, no vaginal discharge and no vomiting    Risk factors: no alcohol abuse, no aspirin use, not elderly, has not had multiple surgeries, no NSAID use, not obese, not pregnant and no recent hospitalization        Prior to Admission Medications   Prescriptions Last Dose Informant Patient Reported? Taking?    Cholecalciferol 2000 units CAPS Not Taking at Unknown time Self Yes No   Sig: Take 1 capsule by mouth   levocetirizine (XYZAL) 5 MG tablet Not Taking at Unknown time  No No   Sig: Take 1 tablet (5 mg total) by mouth every evening   Patient not taking: Reported on 10/4/2019   levothyroxine (SYNTHROID) 75 mcg tablet   No Yes   Sig: Take 1 tablet (75 mcg total) by mouth daily   pimecrolimus (ELIDEL) 1 % cream Not Taking at Unknown time  No No   Sig: Apply topically 2 (two) times a day   Patient not taking: Reported on 10/4/2019      Facility-Administered Medications: None       Past Medical History:   Diagnosis Date    No known problems        Past Surgical History:   Procedure Laterality Date    GALLBLADDER SURGERY         Family History   Problem Relation Age of Onset    Stroke Mother         Cerebrovascular Accident    Diabetes Mother         Mellitus    Heart disease Father         Cardiac Disorder    Cancer Sister     Thyroid disease Sister     Breast cancer Sister     Thyroid disease Other      I have reviewed and agree with the history as documented  Social History     Tobacco Use    Smoking status: Never Smoker    Smokeless tobacco: Never Used   Substance Use Topics    Alcohol use: Yes     Comment: Rare use; Per Allscript No Alcohol use    Drug use: No        Review of Systems   Constitutional: Negative for activity change, appetite change, chills, diaphoresis, fatigue, fever and unexpected weight change  HENT: Negative for congestion, rhinorrhea, sinus pressure, sore throat and trouble swallowing  Eyes: Negative for photophobia and visual disturbance  Respiratory: Negative for apnea, cough, choking, chest tightness, shortness of breath, wheezing and stridor  Cardiovascular: Negative for chest pain, palpitations and leg swelling  Gastrointestinal: Positive for abdominal pain   Negative for abdominal distention, anorexia, blood in stool, constipation, diarrhea, flatus, hematemesis, hematochezia, melena, nausea and vomiting  Genitourinary: Negative for decreased urine volume, difficulty urinating, dysuria, enuresis, flank pain, frequency, hematuria, urgency, vaginal bleeding and vaginal discharge  Musculoskeletal: Negative for arthralgias, myalgias, neck pain and neck stiffness  Skin: Negative for color change, pallor, rash and wound  Allergic/Immunologic: Negative  Neurological: Negative for dizziness, tremors, syncope, weakness, light-headedness, numbness and headaches  Hematological: Negative  Psychiatric/Behavioral: Negative  All other systems reviewed and are negative  Physical Exam  Physical Exam   Constitutional: She is oriented to person, place, and time  She appears well-developed and well-nourished  Non-toxic appearance  She does not have a sickly appearance  She does not appear ill  No distress  HENT:   Head: Normocephalic and atraumatic  Eyes: Pupils are equal, round, and reactive to light  EOM and lids are normal    Neck: Normal range of motion  Neck supple  Cardiovascular: Normal rate, regular rhythm, S1 normal, S2 normal, normal heart sounds, intact distal pulses and normal pulses  Exam reveals no gallop, no distant heart sounds, no friction rub and no decreased pulses  No murmur heard  Pulses:       Radial pulses are 2+ on the right side, and 2+ on the left side  Pulmonary/Chest: Effort normal and breath sounds normal  No accessory muscle usage  No apnea, no tachypnea and no bradypnea  No respiratory distress  She has no decreased breath sounds  She has no wheezes  She has no rhonchi  She has no rales  Abdominal: Soft  Normal appearance  She exhibits no distension  There is no tenderness  There is no rigidity, no rebound and no guarding  Musculoskeletal: Normal range of motion  She exhibits no edema, tenderness or deformity  Neurological: She is alert and oriented to person, place, and time  No cranial nerve deficit  GCS eye subscore is 4  GCS verbal subscore is 5   GCS motor subscore is 6  Skin: Skin is warm, dry and intact  No rash noted  She is not diaphoretic  No erythema  No pallor  Psychiatric: Her speech is normal    Nursing note and vitals reviewed        Vital Signs  ED Triage Vitals [10/04/19 1810]   Temperature Pulse Respirations Blood Pressure SpO2   97 6 °F (36 4 °C) (!) 53 18 (!) 179/79 98 %      Temp Source Heart Rate Source Patient Position - Orthostatic VS BP Location FiO2 (%)   Oral Monitor Sitting Left arm --      Pain Score       3           Vitals:    10/04/19 1810 10/04/19 1930   BP: (!) 179/79 145/74   Pulse: (!) 53 55   Patient Position - Orthostatic VS: Sitting Sitting         Visual Acuity      ED Medications  Medications - No data to display    Diagnostic Studies  Results Reviewed     Procedure Component Value Units Date/Time    Urine Microscopic [[id number]]  (Abnormal) Collected:  10/04/19 1916    Lab Status:  Final result Specimen:  Urine, Clean Catch Updated:  10/04/19 2003     RBC, UA None Seen /hpf      WBC, UA 2-4 /hpf      Epithelial Cells Occasional /hpf      Bacteria, UA Occasional /hpf     Comprehensive metabolic panel [[id number]] Collected:  10/04/19 1917    Lab Status:  Final result Specimen:  Blood from Arm, Right Updated:  10/04/19 1948     Sodium 141 mmol/L      Potassium 3 8 mmol/L      Chloride 105 mmol/L      CO2 28 mmol/L      ANION GAP 8 mmol/L      BUN 14 mg/dL      Creatinine 0 91 mg/dL      Glucose 90 mg/dL      Calcium 9 2 mg/dL      AST 20 U/L      ALT 20 U/L      Alkaline Phosphatase 90 U/L      Total Protein 8 1 g/dL      Albumin 3 9 g/dL      Total Bilirubin 0 30 mg/dL      eGFR 73 ml/min/1 73sq m     Narrative:       Rekha guidelines for Chronic Kidney Disease (CKD):     Stage 1 with normal or high GFR (GFR > 90 mL/min/1 73 square meters)    Stage 2 Mild CKD (GFR = 60-89 mL/min/1 73 square meters)    Stage 3A Moderate CKD (GFR = 45-59 mL/min/1 73 square meters)    Stage 3B Moderate CKD (GFR = 30-44 mL/min/1 73 square meters)    Stage 4 Severe CKD (GFR = 15-29 mL/min/1 73 square meters)    Stage 5 End Stage CKD (GFR <15 mL/min/1 73 square meters)  Note: GFR calculation is accurate only with a steady state creatinine    Lipase [[id number]]  (Normal) Collected:  10/04/19 1917    Lab Status:  Final result Specimen:  Blood from Arm, Right Updated:  10/04/19 1948     Lipase 107 u/L     UA w Reflex to Microscopic w Reflex to Culture [[id number]]  (Abnormal) Collected:  10/04/19 1916    Lab Status:  Final result Specimen:  Urine, Clean Catch Updated:  10/1946     Color, UA Yellow     Clarity, UA Clear     Specific Gravity, UA >=1 030     pH, UA 5 5     Leukocytes, UA Trace     Nitrite, UA Negative     Protein, UA Negative mg/dl      Glucose, UA Negative mg/dl      Ketones, UA Trace mg/dl      Urobilinogen, UA 0 2 E U /dl      Bilirubin, UA Negative     Blood, UA Negative    CBC and differential [[id number]] Collected:  10/04/19 1917    Lab Status:  Final result Specimen:  Blood from Arm, Right Updated:  10/04/19 1926     WBC 7 57 Thousand/uL      RBC 4 92 Million/uL      Hemoglobin 13 7 g/dL      Hematocrit 43 0 %      MCV 87 fL      MCH 27 8 pg      MCHC 31 9 g/dL      RDW 13 7 %      MPV 9 3 fL      Platelets 571 Thousands/uL      nRBC 0 /100 WBCs      Neutrophils Relative 49 %      Immat GRANS % 0 %      Lymphocytes Relative 41 %      Monocytes Relative 7 %      Eosinophils Relative 2 %      Basophils Relative 1 %      Neutrophils Absolute 3 76 Thousands/µL      Immature Grans Absolute 0 01 Thousand/uL      Lymphocytes Absolute 3 10 Thousands/µL      Monocytes Absolute 0 50 Thousand/µL      Eosinophils Absolute 0 15 Thousand/µL      Basophils Absolute 0 05 Thousands/µL                  No orders to display              Procedures  Procedures       ED Course                               MDM  Number of Diagnoses or Management Options  Nonspecific abdominal pain: new and requires workup  Diagnosis management comments: Differential diagnosis includes but is not limited to gastritis, gastroenteritis, peptic ulcer disease, doubt obstruction, doubt surgical pathology  Plan:  Labs  At this point pain has all resolved making acute surgical process less likely, doubt obstruction as she is still passing gas, tolerating p o  We discussed imaging verses awaiting labs and she would prefer to wait labs  Amount and/or Complexity of Data Reviewed  Clinical lab tests: ordered and reviewed    Risk of Complications, Morbidity, and/or Mortality  Presenting problems: moderate  Management options: low  General comments: 28-year-old female with abdominal pain, now resolved  Unremarkable labs  Suspect this is gastritis, possibly peptic ulcer disease  At this point I have low suspicion for acute surgical pathology including obstruction or appendicitis  She agrees to defer CT abdomen pelvis  She will follow up with GI as needed  We discussed earlier return parameters  Will start her on Protonix, Carafate  Patient understands and agrees with this plan  Patient Progress  Patient progress: stable      Disposition  Final diagnoses:   Nonspecific abdominal pain     Time reflects when diagnosis was documented in both MDM as applicable and the Disposition within this note     Time User Action Codes Description Comment    10/4/2019  8:26 PM Cosme Arevalo Add [R10 9] Nonspecific abdominal pain       ED Disposition     ED Disposition Condition Date/Time Comment    Discharge Stable Fri Oct 4, 2019  8:26 PM Brittney Ramos discharge to home/self care  Follow-up Information     Follow up With Specialties Details Why [address] [facility], DO Gastroenterology Call today for GI follow up [address]            Discharge Medication List as of 10/4/2019  8:29 PM      START taking these medications    Details   Lidocaine Viscous HCl (XYLOCAINE) 2 % mucosal solution Swish and swallow 15 mL 4 (four) times a day as needed for mild pain, Starting Fri 10/4/2019, Print      pantoprazole (PROTONIX) 20 mg tablet Take 1 tablet (20 mg total) by mouth daily, Starting Fri 10/4/2019, Print      sucralfate (CARAFATE) 1 g tablet Take 1 tablet (1 g total) by mouth 4 (four) times a day, Starting Fri 10/4/2019, Print         CONTINUE these medications which have NOT CHANGED    Details   levothyroxine (SYNTHROID) 75 mcg tablet Take 1 tablet (75 mcg total) by mouth daily, Starting Wed 5/29/2019, Normal      Cholecalciferol 2000 units CAPS Take 1 capsule by mouth, Historical Med      levocetirizine (XYZAL) 5 MG tablet Take 1 tablet (5 mg total) by mouth every evening, Starting Mon 11/12/2018, Normal      pimecrolimus (ELIDEL) 1 % cream Apply topically 2 (two) times a day, Starting Mon 11/5/2018, Print           No discharge procedures on file      ED Provider  Electronically Signed by           Sita Perez PA-C  10/05/19 3120

## 2019-10-07 DIAGNOSIS — Z12.31 SCREENING MAMMOGRAM, ENCOUNTER FOR: Primary | ICD-10-CM

## 2019-10-10 ENCOUNTER — PREP FOR PROCEDURE (OUTPATIENT)
Dept: GASTROENTEROLOGY | Facility: CLINIC | Age: 74
End: 2019-10-10

## 2019-10-10 ENCOUNTER — OFFICE VISIT (OUTPATIENT)
Dept: GASTROENTEROLOGY | Facility: CLINIC | Age: 74
End: 2019-10-10
Payer: MEDICARE

## 2019-10-10 VITALS
DIASTOLIC BLOOD PRESSURE: 80 MMHG | HEART RATE: 71 BPM | SYSTOLIC BLOOD PRESSURE: 128 MMHG | WEIGHT: 219.2 LBS | BODY MASS INDEX: 35.23 KG/M2 | HEIGHT: 66 IN

## 2019-10-10 DIAGNOSIS — R10.84 GENERALIZED ABDOMINAL PAIN: Primary | ICD-10-CM

## 2019-10-10 DIAGNOSIS — K21.9 GASTROESOPHAGEAL REFLUX DISEASE, ESOPHAGITIS PRESENCE NOT SPECIFIED: ICD-10-CM

## 2019-10-10 PROCEDURE — 99203 OFFICE O/P NEW LOW 30 MIN: CPT | Performed by: PHYSICIAN ASSISTANT

## 2019-10-10 NOTE — PROGRESS NOTES
[facility] - Outpatient Consultation  Tom Mcbride 68 y o  female MRN: [id number]  Encounter: [id number]          ASSESSMENT AND PLAN:      1  Generalized abdominal pain  Recurrent and severe  Periumbilical  Post-prandial    Started on Pantoprazole -will continue  Plan EGD  She is due for a colonoscopy - will schedule  ______________________________________________________________________    HPI:  68year old female presents for evaluation of abdominal pain  First episode occurred last Thursday  The pain is periumbilical with radiation to her back  The first episode lasted for several hours before spontaneously resolving  It was associated with nausea but no vomiting  There is no diarrhea or constipation  There is no rectal bleeding or melena  She felt well on the following day had a recurrence of the symptom  Both episodes occurred after eating a spicy meal   After the 2nd episode she did present to the emergency room  Labs were unremarkable  No imaging was performed  She is status post cholecystectomy  She denies ever having symptoms like this in the past but she does have a history of acid reflux  In the emergency room she was prescribed Carafate and pantoprazole which he has been taking  She states that she is a little bit nauseated but has not had a recurrence of the pain since Friday  Her last upper endoscopy was in 2007  She had a colonoscopy in 2014 with hyperplastic polyp removed  She denies any recent unexpected weight loss  REVIEW OF SYSTEMS:    CONSTITUTIONAL: Denies any fever, chills, rigors, and weight loss  HEENT: No earache or tinnitus  Denies hearing loss or visual disturbances  CARDIOVASCULAR: No chest pain or palpitations  RESPIRATORY: Denies any cough, hemoptysis, shortness of breath or dyspnea on exertion  GASTROINTESTINAL: As noted in the History of Present Illness  GENITOURINARY: No problems with urination   Denies any hematuria or dysuria  NEUROLOGIC: No dizziness or vertigo, denies headaches  MUSCULOSKELETAL: Denies any muscle or joint pain  SKIN: Denies skin rashes or itching  ENDOCRINE: Denies excessive thirst  Denies intolerance to heat or cold  PSYCHOSOCIAL: Denies depression or anxiety  Denies any recent memory loss  Historical Information   Past Medical History:   Diagnosis Date    No known problems      Past Surgical History:   Procedure Laterality Date    GALLBLADDER SURGERY       Social History   Social History     Substance and Sexual Activity   Alcohol Use Yes    Comment: Rare use; Per Allscript No Alcohol use     Social History     Substance and Sexual Activity   Drug Use No     Social History     Tobacco Use   Smoking Status Never Smoker   Smokeless Tobacco Never Used     Family History   Problem Relation Age of Onset    Stroke Mother         Cerebrovascular Accident    Diabetes Mother         Mellitus    Heart disease Father         Cardiac Disorder    Cancer Sister     Thyroid disease Sister     Breast cancer Sister     Thyroid disease Other        Meds/Allergies       Current Outpatient Medications:     levocetirizine (XYZAL) 5 MG tablet    levothyroxine (SYNTHROID) 75 mcg tablet    pantoprazole (PROTONIX) 20 mg tablet    pimecrolimus (ELIDEL) 1 % cream    sucralfate (CARAFATE) 1 g tablet    Cholecalciferol 2000 units CAPS    Lidocaine Viscous HCl (XYLOCAINE) 2 % mucosal solution    Allergies   Allergen Reactions    Iodine Hives    Isoflavones     Levofloxacin     Shellfish Allergy Hives           Objective     Blood pressure 128/80, pulse 71, height 5' 6" (1 676 m), weight 99 4 kg (219 lb 3 2 oz)  Body mass index is 35 38 kg/m²          PHYSICAL EXAM:      General Appearance:   Alert, cooperative, no distress   HEENT:   Normocephalic, atraumatic, anicteric      Neck:  Supple, symmetrical, trachea midline   Lungs:   Clear to auscultation bilaterally; no rales, rhonchi or wheezing; respirations unlabored    Heart[de-identified]   Regular rate and rhythm; no murmur, rub, or gallop  Abdomen:   Soft, non-tender, non-distended; normal bowel sounds; no masses, no organomegaly    Genitalia:   Deferred    Rectal:   Deferred    Extremities:  No cyanosis, clubbing or edema    Pulses:  2+ and symmetric    Skin:  No jaundice, rashes, or lesions    Lymph nodes:  No palpable cervical lymphadenopathy        Lab Results:   No visits with results within 1 Day(s) from this visit     Latest known visit with results is:   Admission on 10/04/2019, Discharged on 10/04/2019   Component Date Value    WBC 10/04/2019 7 57     RBC 10/04/2019 4 92     Hemoglobin 10/04/2019 13 7     Hematocrit 10/04/2019 43 0     MCV 10/04/2019 87     MCH 10/04/2019 27 8     MCHC 10/04/2019 31 9     RDW 10/04/2019 13 7     MPV 10/04/2019 9 3     Platelets 45/27/4331 260     nRBC 10/04/2019 0     Neutrophils Relative 10/04/2019 49     Immat GRANS % 10/04/2019 0     Lymphocytes Relative 10/04/2019 41     Monocytes Relative 10/04/2019 7     Eosinophils Relative 10/04/2019 2     Basophils Relative 10/04/2019 1     Neutrophils Absolute 10/04/2019 3 76     Immature Grans Absolute 10/04/2019 0 01     Lymphocytes Absolute 10/04/2019 3 10     Monocytes Absolute 10/04/2019 0 50     Eosinophils Absolute 10/04/2019 0 15     Basophils Absolute 10/04/2019 0 05     Sodium 10/04/2019 141     Potassium 10/04/2019 3 8     Chloride 10/04/2019 105     CO2 10/04/2019 28     ANION GAP 10/04/2019 8     BUN 10/04/2019 14     Creatinine 10/04/2019 0 91     Glucose 10/04/2019 90     Calcium 10/04/2019 9 2     AST 10/04/2019 20     ALT 10/04/2019 20     Alkaline Phosphatase 10/04/2019 90     Total Protein 10/04/2019 8 1     Albumin 10/04/2019 3 9     Total Bilirubin 10/04/2019 0 30     eGFR 10/04/2019 73     Lipase 10/04/2019 107     Color, UA 10/04/2019 Yellow     Clarity, UA 10/04/2019 Clear     Specific Gravity, UA 10/04/2019 >=1 030     pH, UA 10/04/2019 5 5     Leukocytes, UA 10/04/2019 Trace*    Nitrite, UA 10/04/2019 Negative     Protein, UA 10/04/2019 Negative     Glucose, UA 10/04/2019 Negative     Ketones, UA 10/04/2019 Trace*    Urobilinogen, UA 10/04/2019 0 2     Bilirubin, UA 10/04/2019 Negative     Blood, UA 10/04/2019 Negative     RBC, UA 10/04/2019 None Seen     WBC, UA 10/04/2019 2-4*    Epithelial Cells 10/04/2019 Occasional     Bacteria, UA 10/04/2019 Occasional          Radiology Results:   No results found

## 2019-10-10 NOTE — H&P (VIEW-ONLY)
[facility] - Outpatient Consultation  Luis Somers 68 y o  female MRN: [id number]  Encounter: [id number]          ASSESSMENT AND PLAN:      1  Generalized abdominal pain  Recurrent and severe  Periumbilical  Post-prandial    Started on Pantoprazole -will continue  Plan EGD  She is due for a colonoscopy - will schedule  ______________________________________________________________________    HPI:  68year old female presents for evaluation of abdominal pain  First episode occurred last Thursday  The pain is periumbilical with radiation to her back  The first episode lasted for several hours before spontaneously resolving  It was associated with nausea but no vomiting  There is no diarrhea or constipation  There is no rectal bleeding or melena  She felt well on the following day had a recurrence of the symptom  Both episodes occurred after eating a spicy meal   After the 2nd episode she did present to the emergency room  Labs were unremarkable  No imaging was performed  She is status post cholecystectomy  She denies ever having symptoms like this in the past but she does have a history of acid reflux  In the emergency room she was prescribed Carafate and pantoprazole which he has been taking  She states that she is a little bit nauseated but has not had a recurrence of the pain since Friday  Her last upper endoscopy was in 2007  She had a colonoscopy in 2014 with hyperplastic polyp removed  She denies any recent unexpected weight loss  REVIEW OF SYSTEMS:    CONSTITUTIONAL: Denies any fever, chills, rigors, and weight loss  HEENT: No earache or tinnitus  Denies hearing loss or visual disturbances  CARDIOVASCULAR: No chest pain or palpitations  RESPIRATORY: Denies any cough, hemoptysis, shortness of breath or dyspnea on exertion  GASTROINTESTINAL: As noted in the History of Present Illness  GENITOURINARY: No problems with urination   Denies any hematuria or dysuria  NEUROLOGIC: No dizziness or vertigo, denies headaches  MUSCULOSKELETAL: Denies any muscle or joint pain  SKIN: Denies skin rashes or itching  ENDOCRINE: Denies excessive thirst  Denies intolerance to heat or cold  PSYCHOSOCIAL: Denies depression or anxiety  Denies any recent memory loss  Historical Information   Past Medical History:   Diagnosis Date    No known problems      Past Surgical History:   Procedure Laterality Date    GALLBLADDER SURGERY       Social History   Social History     Substance and Sexual Activity   Alcohol Use Yes    Comment: Rare use; Per Allscript No Alcohol use     Social History     Substance and Sexual Activity   Drug Use No     Social History     Tobacco Use   Smoking Status Never Smoker   Smokeless Tobacco Never Used     Family History   Problem Relation Age of Onset    Stroke Mother         Cerebrovascular Accident    Diabetes Mother         Mellitus    Heart disease Father         Cardiac Disorder    Cancer Sister     Thyroid disease Sister     Breast cancer Sister     Thyroid disease Other        Meds/Allergies       Current Outpatient Medications:     levocetirizine (XYZAL) 5 MG tablet    levothyroxine (SYNTHROID) 75 mcg tablet    pantoprazole (PROTONIX) 20 mg tablet    pimecrolimus (ELIDEL) 1 % cream    sucralfate (CARAFATE) 1 g tablet    Cholecalciferol 2000 units CAPS    Lidocaine Viscous HCl (XYLOCAINE) 2 % mucosal solution    Allergies   Allergen Reactions    Iodine Hives    Isoflavones     Levofloxacin     Shellfish Allergy Hives           Objective     Blood pressure 128/80, pulse 71, height 5' 6" (1 676 m), weight 99 4 kg (219 lb 3 2 oz)  Body mass index is 35 38 kg/m²          PHYSICAL EXAM:      General Appearance:   Alert, cooperative, no distress   HEENT:   Normocephalic, atraumatic, anicteric      Neck:  Supple, symmetrical, trachea midline   Lungs:   Clear to auscultation bilaterally; no rales, rhonchi or wheezing; respirations unlabored    Heart[de-identified]   Regular rate and rhythm; no murmur, rub, or gallop  Abdomen:   Soft, non-tender, non-distended; normal bowel sounds; no masses, no organomegaly    Genitalia:   Deferred    Rectal:   Deferred    Extremities:  No cyanosis, clubbing or edema    Pulses:  2+ and symmetric    Skin:  No jaundice, rashes, or lesions    Lymph nodes:  No palpable cervical lymphadenopathy        Lab Results:   No visits with results within 1 Day(s) from this visit     Latest known visit with results is:   Admission on 10/04/2019, Discharged on 10/04/2019   Component Date Value    WBC 10/04/2019 7 57     RBC 10/04/2019 4 92     Hemoglobin 10/04/2019 13 7     Hematocrit 10/04/2019 43 0     MCV 10/04/2019 87     MCH 10/04/2019 27 8     MCHC 10/04/2019 31 9     RDW 10/04/2019 13 7     MPV 10/04/2019 9 3     Platelets 13/24/5133 260     nRBC 10/04/2019 0     Neutrophils Relative 10/04/2019 49     Immat GRANS % 10/04/2019 0     Lymphocytes Relative 10/04/2019 41     Monocytes Relative 10/04/2019 7     Eosinophils Relative 10/04/2019 2     Basophils Relative 10/04/2019 1     Neutrophils Absolute 10/04/2019 3 76     Immature Grans Absolute 10/04/2019 0 01     Lymphocytes Absolute 10/04/2019 3 10     Monocytes Absolute 10/04/2019 0 50     Eosinophils Absolute 10/04/2019 0 15     Basophils Absolute 10/04/2019 0 05     Sodium 10/04/2019 141     Potassium 10/04/2019 3 8     Chloride 10/04/2019 105     CO2 10/04/2019 28     ANION GAP 10/04/2019 8     BUN 10/04/2019 14     Creatinine 10/04/2019 0 91     Glucose 10/04/2019 90     Calcium 10/04/2019 9 2     AST 10/04/2019 20     ALT 10/04/2019 20     Alkaline Phosphatase 10/04/2019 90     Total Protein 10/04/2019 8 1     Albumin 10/04/2019 3 9     Total Bilirubin 10/04/2019 0 30     eGFR 10/04/2019 73     Lipase 10/04/2019 107     Color, UA 10/04/2019 Yellow     Clarity, UA 10/04/2019 Clear     Specific Gravity, UA 10/04/2019 >=1 030     pH, UA 10/04/2019 5 5     Leukocytes, UA 10/04/2019 Trace*    Nitrite, UA 10/04/2019 Negative     Protein, UA 10/04/2019 Negative     Glucose, UA 10/04/2019 Negative     Ketones, UA 10/04/2019 Trace*    Urobilinogen, UA 10/04/2019 0 2     Bilirubin, UA 10/04/2019 Negative     Blood, UA 10/04/2019 Negative     RBC, UA 10/04/2019 None Seen     WBC, UA 10/04/2019 2-4*    Epithelial Cells 10/04/2019 Occasional     Bacteria, UA 10/04/2019 Occasional          Radiology Results:   No results found

## 2019-10-15 ENCOUNTER — HOSPITAL ENCOUNTER (OUTPATIENT)
Dept: ULTRASOUND IMAGING | Facility: HOSPITAL | Age: 74
Discharge: HOME | End: 2019-10-15
Payer: MEDICARE

## 2019-10-15 DIAGNOSIS — R10.84 GENERALIZED ABDOMINAL PAIN: ICD-10-CM

## 2019-10-15 PROCEDURE — 76700 US EXAM ABDOM COMPLETE: CPT

## 2019-10-17 ENCOUNTER — TELEPHONE (OUTPATIENT)
Dept: GASTROENTEROLOGY | Facility: CLINIC | Age: 74
End: 2019-10-17

## 2019-10-17 NOTE — TELEPHONE ENCOUNTER
----- Message from Arsen Burden PA-C sent at 10/17/2019 10:37 AM EDT -----  Please let her know this did not show anything significant - only a mild fatty liver

## 2019-10-20 ENCOUNTER — ANESTHESIA EVENT (OUTPATIENT)
Dept: GASTROENTEROLOGY | Facility: HOSPITAL | Age: 74
End: 2019-10-20

## 2019-10-20 NOTE — ANESTHESIA PREPROCEDURE EVALUATION
Review of Systems/Medical History  Patient summary reviewed        Cardiovascular  Hyperlipidemia,    Pulmonary  Negative pulmonary ROS        GI/Hepatic  Negative GI/hepatic ROS          Negative  ROS        Endo/Other  History of thyroid disease , hypothyroidism,   Obesity  morbid obesity   GYN  Negative gynecology ROS          Hematology  Negative hematology ROS      Musculoskeletal  Negative musculoskeletal ROS        Neurology  Negative neurology ROS      Psychology   Negative psychology ROS              Physical Exam    Airway    Mallampati score: II  TM Distance: >3 FB  Neck ROM: full     Dental       Cardiovascular  Cardiovascular exam normal    Pulmonary  Pulmonary exam normal     Other Findings        Anesthesia Plan  ASA Score- 3     Anesthesia Type- IV sedation with anesthesia with ASA Monitors  Additional Monitors:   Airway Plan:         Plan Factors-    Induction- intravenous  Postoperative Plan-     Informed Consent- Anesthetic plan and risks discussed with patient  I personally reviewed this patient with the CRNA  Discussed and agreed on the Anesthesia Plan with the CRNA  Jane Console

## 2019-10-21 ENCOUNTER — HOSPITAL ENCOUNTER (OUTPATIENT)
Dept: GASTROENTEROLOGY | Facility: HOSPITAL | Age: 74
Setting detail: OUTPATIENT SURGERY
Discharge: HOME | End: 2019-10-21
Attending: INTERNAL MEDICINE
Payer: MEDICARE

## 2019-10-21 ENCOUNTER — ANESTHESIA (OUTPATIENT)
Dept: GASTROENTEROLOGY | Facility: HOSPITAL | Age: 74
End: 2019-10-21

## 2019-10-21 VITALS
SYSTOLIC BLOOD PRESSURE: 128 MMHG | HEIGHT: 66 IN | HEART RATE: 65 BPM | BODY MASS INDEX: 34.58 KG/M2 | WEIGHT: 215.17 LBS | DIASTOLIC BLOOD PRESSURE: 82 MMHG | TEMPERATURE: 98.8 F | OXYGEN SATURATION: 95 % | RESPIRATION RATE: 15 BRPM

## 2019-10-21 DIAGNOSIS — K21.9 GASTROESOPHAGEAL REFLUX DISEASE, ESOPHAGITIS PRESENCE NOT SPECIFIED: ICD-10-CM

## 2019-10-21 DIAGNOSIS — R10.84 GENERALIZED ABDOMINAL PAIN: ICD-10-CM

## 2019-10-21 PROCEDURE — 88305 TISSUE EXAM BY PATHOLOGIST: CPT | Performed by: PATHOLOGY

## 2019-10-21 PROCEDURE — 45378 DIAGNOSTIC COLONOSCOPY: CPT | Performed by: INTERNAL MEDICINE

## 2019-10-21 PROCEDURE — 43239 EGD BIOPSY SINGLE/MULTIPLE: CPT | Performed by: INTERNAL MEDICINE

## 2019-10-21 PROCEDURE — 88342 IMHCHEM/IMCYTCHM 1ST ANTB: CPT | Performed by: PATHOLOGY

## 2019-10-21 RX ORDER — SODIUM CHLORIDE, SODIUM LACTATE, POTASSIUM CHLORIDE, CALCIUM CHLORIDE 600; 310; 30; 20 MG/100ML; MG/100ML; MG/100ML; MG/100ML
125 INJECTION, SOLUTION INTRAVENOUS CONTINUOUS
Status: DISCONTINUED | OUTPATIENT
Start: 2019-10-21 | End: 2019-10-25 | Stop reason: HOSPADM

## 2019-10-21 RX ORDER — PROPOFOL 10 MG/ML
INJECTION, EMULSION INTRAVENOUS AS NEEDED
Status: DISCONTINUED | OUTPATIENT
Start: 2019-10-21 | End: 2019-10-21 | Stop reason: SURG

## 2019-10-21 RX ADMIN — LIDOCAINE HYDROCHLORIDE 100 MG: 20 INJECTION, SOLUTION INTRAVENOUS at 09:37

## 2019-10-21 RX ADMIN — PROPOFOL 25 MG: 10 INJECTION, EMULSION INTRAVENOUS at 09:48

## 2019-10-21 RX ADMIN — PROPOFOL 130 MG: 10 INJECTION, EMULSION INTRAVENOUS at 09:37

## 2019-10-21 RX ADMIN — SODIUM CHLORIDE, SODIUM LACTATE, POTASSIUM CHLORIDE, AND CALCIUM CHLORIDE: .6; .31; .03; .02 INJECTION, SOLUTION INTRAVENOUS at 09:20

## 2019-10-21 RX ADMIN — PROPOFOL 20 MG: 10 INJECTION, EMULSION INTRAVENOUS at 09:40

## 2019-10-21 RX ADMIN — PROPOFOL 25 MG: 10 INJECTION, EMULSION INTRAVENOUS at 09:44

## 2019-10-21 NOTE — ANESTHESIA POSTPROCEDURE EVALUATION
Post-Op Assessment Note    CV Status:  Stable       Mental Status:  Awake   Hydration Status:  Stable   PONV Controlled:  None   Airway Patency:  Patent   Post Op Vitals Reviewed: Yes      Staff: CRNA           /59 (10/21/19 0955)    Temp 98 8 °F (37 1 °C) (10/21/19 0955)    Pulse 72 (10/21/19 0955)   Resp   14   SpO2 93 % (10/21/19 0955)    Post procedure VS noted above, SV non obstructed on [facility]

## 2019-10-21 NOTE — INTERVAL H&P NOTE
H&P reviewed  After examining the patient I find no changes in the patients condition since the H&P had been written      Vitals:    10/21/19 0917   BP: 141/78   Pulse: 69   Resp: 20   Temp: 98 °F (36 7 °C)   SpO2: 98%

## 2019-10-21 NOTE — DISCHARGE INSTRUCTIONS

## 2019-10-24 DIAGNOSIS — R10.9 NONSPECIFIC ABDOMINAL PAIN: ICD-10-CM

## 2019-10-24 RX ORDER — PANTOPRAZOLE SODIUM 20 MG/1
20 TABLET, DELAYED RELEASE ORAL DAILY
Qty: 30 TABLET | Refills: 0 | Status: SHIPPED | OUTPATIENT
Start: 2019-10-24 | End: 2019-11-04

## 2019-10-24 NOTE — TELEPHONE ENCOUNTER
Spoke with patient advised biopsies were normal  She is requesting a refill on her Protonix 20 mg  Request will be sent to PA for refill

## 2019-10-24 NOTE — TELEPHONE ENCOUNTER
----- Message from Sanjay Jalloh MD sent at 10/24/2019  8:24 AM EDT -----  pls tell her all the biopsies are normal

## 2019-10-25 DIAGNOSIS — K21.9 GASTROESOPHAGEAL REFLUX DISEASE, ESOPHAGITIS PRESENCE NOT SPECIFIED: Primary | ICD-10-CM

## 2019-10-25 RX ORDER — PANTOPRAZOLE SODIUM 40 MG/1
40 TABLET, DELAYED RELEASE ORAL DAILY
Qty: 30 TABLET | Refills: 3 | Status: SHIPPED | OUTPATIENT
Start: 2019-10-25 | End: 2019-11-04

## 2019-10-25 NOTE — TELEPHONE ENCOUNTER
Amber - Patient called [facility] [id number] [facility] [place] has not received the script for pantoprazole  Can we check on this for patient   [facility] [phone] ty

## 2019-11-04 ENCOUNTER — OFFICE VISIT (OUTPATIENT)
Dept: FAMILY MEDICINE CLINIC | Facility: CLINIC | Age: 74
End: 2019-11-04
Payer: MEDICARE

## 2019-11-04 VITALS
HEIGHT: 66 IN | HEART RATE: 61 BPM | DIASTOLIC BLOOD PRESSURE: 92 MMHG | SYSTOLIC BLOOD PRESSURE: 148 MMHG | OXYGEN SATURATION: 97 % | BODY MASS INDEX: 35.42 KG/M2 | TEMPERATURE: 97.6 F | WEIGHT: 220.4 LBS

## 2019-11-04 DIAGNOSIS — E03.9 HYPOTHYROIDISM IN ADULT: ICD-10-CM

## 2019-11-04 DIAGNOSIS — Z00.00 HEALTH CARE MAINTENANCE: Primary | ICD-10-CM

## 2019-11-04 DIAGNOSIS — E78.2 MIXED HYPERLIPIDEMIA: ICD-10-CM

## 2019-11-04 PROCEDURE — G0439 PPPS, SUBSEQ VISIT: HCPCS | Performed by: FAMILY MEDICINE

## 2019-11-04 PROCEDURE — 99213 OFFICE O/P EST LOW 20 MIN: CPT | Performed by: FAMILY MEDICINE

## 2019-11-04 NOTE — PROGRESS NOTES
Assessment/Plan:       Diagnoses and all orders for this visit:    Health care maintenance    Hypothyroidism in adult  -     TSH, 3rd generation; Future    Mixed hyperlipidemia  -     Comprehensive metabolic panel; Future  -     Lipid panel; Future        Hypothyroidism in adult  Continue levothyroxine, check a TSH in 6 months  Mixed hyperlipidemia  Elevated on her most recent blood work, encouraged to watch her diet, repeat CMP, lipid profile in 6 months  Follow-up in 6 months, with her blood work done prior  Subjective:      Patient ID: Deisy Keita is a 68 y o  female  Patient comes in today for recheck on her hypothyroidism, hyperlipidemia  She did have her blood work done at [facility] we do not have the results, however the patient brought them with her  Her cholesterol is slightly higher, her TSH is within normal limits  She continues to take her levothyroxine, no compliance issues or side effects      The following portions of the patient's history were reviewed and updated as appropriate:   She has a past medical history of Colon polyp, Disease of thyroid gland, and No known problems  ,  does not have any pertinent problems on file  ,   has a past surgical history that includes Gallbladder surgery; Colonoscopy; and Cholecystectomy  ,  family history includes Breast cancer in her sister; Cancer in her sister; Diabetes in her mother; Heart disease in her father; Stroke in her mother; Thyroid disease in her other and sister  ,   reports that she has never smoked  She has never used smokeless tobacco  She reports that she drinks alcohol  She reports that she does not use drugs  ,  is allergic to iodine; isoflavones; levofloxacin; and shellfish allergy     Current Outpatient Medications   Medication Sig Dispense Refill    levothyroxine (SYNTHROID) 75 mcg tablet Take 1 tablet (75 mcg total) by mouth daily 90 tablet 3    levocetirizine (XYZAL) 5 MG tablet Take 1 tablet (5 mg total) by mouth every evening (Patient not taking: Reported on 11/4/2019) 30 tablet 5    pimecrolimus (ELIDEL) 1 % cream Apply topically 2 (two) times a day (Patient not taking: Reported on 11/4/2019) 30 g 1     No current facility-administered medications for this visit  Review of Systems   Constitutional: Negative for chills, fatigue and fever  HENT: Negative for congestion, ear pain, hearing loss, postnasal drip, rhinorrhea and sore throat  Eyes: Negative for pain and visual disturbance  Respiratory: Negative for chest tightness, shortness of breath and wheezing  Cardiovascular: Negative for chest pain and leg swelling  Gastrointestinal: Negative for abdominal distention, abdominal pain, constipation, diarrhea and vomiting  Endocrine: Negative for cold intolerance and heat intolerance  Genitourinary: Negative for difficulty urinating, frequency and urgency  Musculoskeletal: Negative for arthralgias and gait problem  Skin: Negative for color change  Neurological: Negative for dizziness, tremors, syncope, numbness and headaches  Hematological: Negative for adenopathy  Psychiatric/Behavioral: Negative for agitation, confusion and sleep disturbance  The patient is not nervous/anxious  Objective:  Vitals:    11/04/19 1322   BP: 148/92   Pulse: 61   Temp: 97 6 °F (36 4 °C)   TempSrc: Tympanic   SpO2: 97%   Weight: 100 kg (220 lb 6 4 oz)   Height: 5' 6" (1 676 m)     Body mass index is 35 57 kg/m²  Physical Exam   Constitutional: She is oriented to person, place, and time  She appears well-developed and well-nourished  HENT:   Head: Normocephalic  Mouth/Throat: Oropharynx is clear and moist    Eyes: Conjunctivae are normal  No scleral icterus  Neck: Normal range of motion  No thyromegaly present  Cardiovascular: Normal rate, regular rhythm and normal heart sounds  No murmur heard  Pulmonary/Chest: Effort normal and breath sounds normal  No respiratory distress  She has no wheezes  Abdominal: Soft  Bowel sounds are normal  She exhibits no distension  There is no tenderness  Musculoskeletal: Normal range of motion  She exhibits no edema or tenderness  Lymphadenopathy:     She has no cervical adenopathy  Neurological: She is alert and oriented to person, place, and time  No cranial nerve deficit  Skin: Skin is warm and dry  No rash noted  No pallor  Psychiatric: She has a normal mood and affect  Her behavior is normal    Nursing note and vitals reviewed

## 2019-11-04 NOTE — PROGRESS NOTES
Assessment and Plan:     Problem List Items Addressed This Visit     None      Visit Diagnoses     Health care maintenance    -  Primary           Preventive health issues were discussed with patient, and age appropriate screening tests were ordered as noted in patient's After Visit Summary  Personalized health advice and appropriate referrals for health education or preventive services given if needed, as noted in patient's After Visit Summary       History of Present Illness:     Patient presents for Medicare Annual Wellness visit    Patient Care Team:  DO latha Rey PCP - [address] [place], [place] [address]DEREK     Problem List:     Patient Active Problem List   Diagnosis    Hypothyroidism in adult    Mixed hyperlipidemia    Vitamin D deficiency    Dermatitis      Past Medical and Surgical History:     Past Medical History:   Diagnosis Date    Colon polyp     Disease of thyroid gland     No known problems      Past Surgical History:   Procedure Laterality Date    CHOLECYSTECTOMY      COLONOSCOPY      GALLBLADDER SURGERY        Family History:     Family History   Problem Relation Age of Onset    Stroke Mother         Cerebrovascular Accident    Diabetes Mother         Mellitus    Heart disease Father         Cardiac Disorder    Cancer Sister     Thyroid disease Sister     Breast cancer Sister     Thyroid disease Other       Social History:     Social History     Socioeconomic History    Marital status: /Civil Union     Spouse name: Not on file    Number of children: Not on file    Years of education: Not on file    Highest education level: Not on file   Occupational History    Occupation: Retired   Social Needs    Financial resource strain: Not on file    Food insecurity:     Worry: Not on file     Inability: Not on file   [employer] needs:     Medical: Not on file     Non-medical: Not on file   Tobacco Use    Smoking status: Never Smoker    Smokeless tobacco: Never Used Substance and Sexual Activity    Alcohol use: Yes     Frequency: Monthly or less     Binge frequency: Less than monthly     Comment: holidays    Drug use: No    Sexual activity: Not on file   Lifestyle    Physical activity:     Days per week: Not on file     Minutes per session: Not on file    Stress: Not on file   Relationships    Social connections:     Talks on phone: Not on file     Gets together: Not on file     Attends Sikhism service: Not on file     Active member of club or organization: Not on file     Attends meetings of clubs or organizations: Not on file     Relationship status: Not on file    Intimate partner violence:     Fear of current or ex partner: Not on file     Emotionally abused: Not on file     Physically abused: Not on file     Forced sexual activity: Not on file   Other Topics Concern    Not on file   Social History Narrative    Always uses seat belt    Does not use caffeine    Exercises Regularly    Live with spouse       Medications and Allergies:     Current Outpatient Medications   Medication Sig Dispense Refill    levothyroxine (SYNTHROID) 75 mcg tablet Take 1 tablet (75 mcg total) by mouth daily 90 tablet 3    levocetirizine (XYZAL) 5 MG tablet Take 1 tablet (5 mg total) by mouth every evening (Patient not taking: Reported on 11/4/2019) 30 tablet 5    pimecrolimus (ELIDEL) 1 % cream Apply topically 2 (two) times a day (Patient not taking: Reported on 11/4/2019) 30 g 1     No current facility-administered medications for this visit  Allergies   Allergen Reactions    Iodine Hives    Isoflavones     Levofloxacin     Shellfish Allergy Hives      Immunizations: There is no immunization history on file for this patient     Health Maintenance:         Topic Date Due    Hepatitis C Screening  1945    MAMMOGRAM  11/09/2019    CRC Screening: Colonoscopy  10/21/2029         Topic Date Due    Pneumococcal Vaccine: 65+ Years (1 of 2 - PCV13) 11/24/2011    INFLUENZA VACCINE  07/01/2019      Medicare Health Risk Assessment:     /92   Pulse 61   Temp 97 6 °F (36 4 °C) (Tympanic)   Ht 5' 6" (1 676 m)   Wt 100 kg (220 lb 6 4 oz)   SpO2 97%   BMI 35 57 kg/m²      Skip Nicole is here for her Subsequent Wellness visit  Health Risk Assessment:   Patient rates overall health as very good  Patient feels that their physical health rating is same  Eyesight was rated as same  Hearing was rated as same  Patient feels that their emotional and mental health rating is same  Pain experienced in the last 7 days has been none  Patient states that she has experienced no weight loss or gain in last 6 months  Depression Screening:   PHQ-2 Score: 0      Fall Risk Screening: In the past year, patient has experienced: history of falling in past year    Feels unsteady when standing or walking?: No    Worried about falling?: No      Urinary Incontinence Screening:   Patient has not leaked urine accidently in the last six months  Home Safety:  Patient does not have trouble with stairs inside or outside of their home  Patient has working smoke alarms and has working carbon monoxide detector  Home safety hazards include: none  Nutrition:   Current diet is Regular  Medications:   Patient is not currently taking any over-the-counter supplements  Patient is able to manage medications  Activities of Daily Living (ADLs)/Instrumental Activities of Daily Living (IADLs):   Walk and transfer into and out of bed and chair?: Yes  Dress and groom yourself?: Yes    Bathe or shower yourself?: Yes    Feed yourself?  Yes  Do your laundry/housekeeping?: Yes  Manage your money, pay your bills and track your expenses?: Yes  Make your own meals?: Yes    Do your own shopping?: Yes    Previous Hospitalizations:   Any hospitalizations or ED visits within the last 12 months?: No      Advance Care Planning:     Advanced directive: Yes    Advanced directive counseling given: Yes      Cognitive Screening:   Provider or family/friend/caregiver concerned regarding cognition?: No    PREVENTIVE SCREENINGS      Cardiovascular Screening:    General: Screening Not Indicated and History Lipid Disorder      Diabetes Screening:     General: Screening Current      Colorectal Cancer Screening:     General: Screening Current      Breast Cancer Screening:     General: Screening Current      Cervical Cancer Screening:    General: Screening Not Indicated      Osteoporosis Screening:    General: Screening Current      Abdominal Aortic Aneurysm (AAA) Screening:        General: Screening Not Indicated      Lung Cancer Screening:     General: Screening Not Indicated      Hepatitis C Screening:    General: Risks and Benefits Discussed and Patient Declines      Marvin Juarez, DO

## 2019-11-04 NOTE — ASSESSMENT & PLAN NOTE
Elevated on her most recent blood work, encouraged to watch her diet, repeat CMP, lipid profile in 6 months

## 2019-11-04 NOTE — PATIENT INSTRUCTIONS
Medicare Preventive Visit Patient Instructions  Thank you for completing your Welcome to Medicare Visit or Medicare Annual Wellness Visit today  Your next wellness visit will be due in one year (11/4/2020)  The screening/preventive services that you may require over the next 5-10 years are detailed below  Some tests may not apply to you based off risk factors and/or age  Screening tests ordered at today's visit but not completed yet may show as past due  Also, please note that scanned in results may not display below  Preventive Screenings:  Service Recommendations Previous Testing/Comments   Colorectal Cancer Screening  * Colonoscopy    * Fecal Occult Blood Test (FOBT)/Fecal Immunochemical Test (FIT)  * Fecal DNA/Cologuard Test  * Flexible Sigmoidoscopy Age: 54-65 years old   Colonoscopy: every 10 years (may be performed more frequently if at higher risk)  OR  FOBT/FIT: every 1 year  OR  Cologuard: every 3 years  OR  Sigmoidoscopy: every 5 years  Screening may be recommended earlier than age 48 if at higher risk for colorectal cancer  Also, an individualized decision between you and your healthcare provider will decide whether screening between the ages of 74-80 would be appropriate  Colonoscopy: 10/21/2019  FOBT/FIT: Not on file  Cologuard: Not on file  Sigmoidoscopy: Not on file    Screening Current     Breast Cancer Screening Age: 36 years old  Frequency: every 1-2 years  Not required if history of left and right mastectomy Mammogram: 11/09/2018    Screening Current   Cervical Cancer Screening Between the ages of 21-29, pap smear recommended once every 3 years  Between the ages of 33-67, can perform pap smear with HPV co-testing every 5 years     Recommendations may differ for women with a history of total hysterectomy, cervical cancer, or abnormal pap smears in past  Pap Smear: Not on file    Screening Not Indicated   Hepatitis C Screening Once for adults born between 1945 and 1965  More frequently in patients at high risk for Hepatitis C Hep C Antibody: Not on file       Diabetes Screening 1-2 times per year if you're at risk for diabetes or have pre-diabetes Fasting glucose: 82 mg/dL   A1C: No results in last 5 years    Screening Current   Cholesterol Screening Once every 5 years if you don't have a lipid disorder  May order more often based on risk factors  Lipid panel: 10/29/2018    Screening Not Indicated  History Lipid Disorder     Other Preventive Screenings Covered by Medicare:  1  Abdominal Aortic Aneurysm (AAA) Screening: covered once if your at risk  You're considered to be at risk if you have a family history of AAA  2  Lung Cancer Screening: covers low dose CT scan once per year if you meet all of the following conditions: (1) Age 50-69; (2) No signs or symptoms of lung cancer; (3) Current smoker or have quit smoking within the last 15 years; (4) You have a tobacco smoking history of at least 30 pack years (packs per day multiplied by number of years you smoked); (5) You get a written order from a healthcare provider  3  Glaucoma Screening: covered annually if you're considered high risk: (1) You have diabetes OR (2) Family history of glaucoma OR (3)  aged 48 and older OR (3)  American aged 72 and older  3  Osteoporosis Screening: covered every 2 years if you meet one of the following conditions: (1) You're estrogen deficient and at risk for osteoporosis based off medical history and other findings; (2) Have a vertebral abnormality; (3) On glucocorticoid therapy for more than 3 months; (4) Have primary hyperparathyroidism; (5) On osteoporosis medications and need to assess response to drug therapy  · Last bone density test (DXA Scan): 04/24/2018  5  HIV Screening: covered annually if you're between the age of 12-76  Also covered annually if you are younger than 13 and older than 72 with risk factors for HIV infection   For pregnant patients, it is covered up to 3 times per pregnancy  Immunizations:  Immunization Recommendations   Influenza Vaccine Annual influenza vaccination during flu season is recommended for all persons aged >= 6 months who do not have contraindications   Pneumococcal Vaccine (Prevnar and Pneumovax)  * Prevnar = PCV13  * Pneumovax = PPSV23   Adults 25-60 years old: 1-3 doses may be recommended based on certain risk factors  Adults 72 years old: Prevnar (PCV13) vaccine recommended followed by Pneumovax (PPSV23) vaccine  If already received PPSV23 since turning 65, then PCV13 recommended at least one year after PPSV23 dose  Hepatitis B Vaccine 3 dose series if at intermediate or high risk (ex: diabetes, end stage renal disease, liver disease)   Tetanus (Td) Vaccine - COST NOT COVERED BY MEDICARE PART B Following completion of primary series, a booster dose should be given every 10 years to maintain immunity against tetanus  Td may also be given as tetanus wound prophylaxis  Tdap Vaccine - COST NOT COVERED BY MEDICARE PART B Recommended at least once for all adults  For pregnant patients, recommended with each pregnancy  Shingles Vaccine (Shingrix) - COST NOT COVERED BY MEDICARE PART B  2 shot series recommended in those aged 48 and above     Health Maintenance Due:      Topic Date Due    Hepatitis C Screening  1945    MAMMOGRAM  11/09/2019    CRC Screening: Colonoscopy  10/21/2029     Immunizations Due:      Topic Date Due    Pneumococcal Vaccine: 65+ Years (1 of 2 - PCV13) 11/24/2011    INFLUENZA VACCINE  07/01/2019     Advance Directives   What are advance directives? Advance directives are legal documents that state your wishes and plans for medical care  These plans are made ahead of time in case you lose your ability to make decisions for yourself  Advance directives can apply to any medical decision, such as the treatments you want, and if you want to donate organs  What are the types of advance directives?   There are many types of advance directives, and each state has rules about how to use them  You may choose a combination of any of the following:  · Living will: This is a written record of the treatment you want  You can also choose which treatments you do not want, which to limit, and which to stop at a certain time  This includes surgery, medicine, IV fluid, and tube feedings  · Durable power of  for healthcare [facility]): This is a written record that states who you want to make healthcare choices for you when you are unable to make them for yourself  This person, called a proxy, is usually a family member or a friend  You may choose more than 1 proxy  · Do not resuscitate (DNR) order:  A DNR order is used in case your heart stops beating or you stop breathing  It is a request not to have certain forms of treatment, such as CPR  A DNR order may be included in other types of advance directives  · Medical directive: This covers the care that you want if you are in a coma, near death, or unable to make decisions for yourself  You can list the treatments you want for each condition  Treatment may include pain medicine, surgery, blood transfusions, dialysis, IV or tube feedings, and a ventilator (breathing machine)  · Values history: This document has questions about your views, beliefs, and how you feel and think about life  This information can help others choose the care that you would choose  Why are advance directives important? An advance directive helps you control your care  Although spoken wishes may be used, it is better to have your wishes written down  Spoken wishes can be misunderstood, or not followed  Treatments may be given even if you do not want them  An advance directive may make it easier for your family to make difficult choices about your care  Fall Prevention    Fall prevention  includes ways to make your home and other areas safer  It also includes ways you can move more carefully to prevent a fall  Health conditions that cause changes in your blood pressure, vision, or muscle strength and coordination may increase your risk for falls  Medicines may also increase your risk for falls if they make you dizzy, weak, or sleepy  Fall prevention tips:   · Stand or sit up slowly  · Use assistive devices as directed  · Wear shoes that fit well and have soles that   · Wear a personal alarm  · Stay active  · Manage your medical conditions  Home Safety Tips:  · Add items to prevent falls in the bathroom  · Keep paths clear  · Install bright lights in your home  · Keep items you use often on shelves within reach  · Paint or place reflective tape on the edges of your stairs  Weight Management   Why it is important to manage your weight:  Being overweight increases your risk of health conditions such as heart disease, high blood pressure, type 2 diabetes, and certain types of cancer  It can also increase your risk for osteoarthritis, sleep apnea, and other respiratory problems  Aim for a slow, steady weight loss  Even a small amount of weight loss can lower your risk of health problems  How to lose weight safely:  A safe and healthy way to lose weight is to eat fewer calories and get regular exercise  You can lose up about 1 pound a week by decreasing the number of calories you eat by 500 calories each day  Healthy meal plan for weight management:  A healthy meal plan includes a variety of foods, contains fewer calories, and helps you stay healthy  A healthy meal plan includes the following:  · Eat whole-grain foods more often  A healthy meal plan should contain fiber  Fiber is the part of grains, fruits, and vegetables that is not broken down by your body  Whole-grain foods are healthy and provide extra fiber in your diet  Some examples of whole-grain foods are whole-wheat breads and pastas, oatmeal, brown rice, and bulgur  · Eat a variety of vegetables every day    Include dark, leafy greens such as spinach, kale, jennifer greens, and mustard greens  Eat yellow and orange vegetables such as carrots, sweet potatoes, and winter squash  · Eat a variety of fruits every day  Choose fresh or canned fruit (canned in its own juice or light syrup) instead of juice  Fruit juice has very little or no fiber  · Eat low-fat dairy foods  Drink fat-free (skim) milk or 1% milk  Eat fat-free yogurt and low-fat cottage cheese  Try low-fat cheeses such as mozzarella and other reduced-fat cheeses  · Choose meat and other protein foods that are low in fat  Choose beans or other legumes such as split peas or lentils  Choose fish, skinless poultry (chicken or turkey), or lean cuts of red meat (beef or pork)  Before you cook meat or poultry, cut off any visible fat  · Use less fat and oil  Try baking foods instead of frying them  Add less fat, such as margarine, sour cream, regular salad dressing and mayonnaise to foods  Eat fewer high-fat foods  Some examples of high-fat foods include french fries, doughnuts, ice cream, and cakes  · Eat fewer sweets  Limit foods and drinks that are high in sugar  This includes candy, cookies, regular soda, and sweetened drinks  Exercise:  Exercise at least 30 minutes per day on most days of the week  Some examples of exercise include walking, biking, dancing, and swimming  You can also fit in more physical activity by taking the stairs instead of the elevator or parking farther away from stores  Ask your healthcare provider about the best exercise plan for you  © Copyright [employer] 2018 Information is for End User's use only and may not be sold, redistributed or otherwise used for commercial purposes   All illustrations and images included in CareNotes® are the copyrighted property of A D A M , Inc  or [address] [place] [address]

## 2020-05-07 ENCOUNTER — TELEPHONE (OUTPATIENT)
Dept: FAMILY MEDICINE CLINIC | Facility: CLINIC | Age: 75
End: 2020-05-07

## 2020-05-12 ENCOUNTER — TELEPHONE (OUTPATIENT)
Dept: FAMILY MEDICINE CLINIC | Facility: CLINIC | Age: 75
End: 2020-05-12

## 2020-05-18 DIAGNOSIS — E03.9 HYPOTHYROIDISM IN ADULT: ICD-10-CM

## 2020-05-18 RX ORDER — LEVOTHYROXINE SODIUM 75 MCG
TABLET ORAL
Qty: 90 TABLET | Refills: 1 | Status: SHIPPED | OUTPATIENT
Start: 2020-05-18 | End: 2020-06-27

## 2020-06-27 ENCOUNTER — OFFICE VISIT (OUTPATIENT)
Dept: FAMILY MEDICINE CLINIC | Facility: CLINIC | Age: 75
End: 2020-06-27
Payer: MEDICARE

## 2020-06-27 VITALS
DIASTOLIC BLOOD PRESSURE: 70 MMHG | BODY MASS INDEX: 35.36 KG/M2 | OXYGEN SATURATION: 94 % | HEIGHT: 66 IN | TEMPERATURE: 97.1 F | HEART RATE: 80 BPM | WEIGHT: 220 LBS | SYSTOLIC BLOOD PRESSURE: 130 MMHG

## 2020-06-27 DIAGNOSIS — E03.9 HYPOTHYROIDISM IN ADULT: Primary | ICD-10-CM

## 2020-06-27 DIAGNOSIS — E78.2 MIXED HYPERLIPIDEMIA: ICD-10-CM

## 2020-06-27 DIAGNOSIS — L30.9 DERMATITIS: ICD-10-CM

## 2020-06-27 PROCEDURE — 3008F BODY MASS INDEX DOCD: CPT | Performed by: FAMILY MEDICINE

## 2020-06-27 PROCEDURE — 99213 OFFICE O/P EST LOW 20 MIN: CPT | Performed by: FAMILY MEDICINE

## 2020-06-27 PROCEDURE — 1036F TOBACCO NON-USER: CPT | Performed by: FAMILY MEDICINE

## 2020-06-27 PROCEDURE — 1160F RVW MEDS BY RX/DR IN RCRD: CPT | Performed by: FAMILY MEDICINE

## 2020-06-27 RX ORDER — LEVOTHYROXINE SODIUM 88 MCG
88 TABLET ORAL DAILY
Qty: 90 TABLET | Refills: 3 | Status: SHIPPED | OUTPATIENT
Start: 2020-06-27 | End: 2021-04-19

## 2020-06-27 RX ORDER — PIMECROLIMUS 10 MG/G
CREAM TOPICAL 2 TIMES DAILY
Qty: 30 G | Refills: 1 | Status: SHIPPED | OUTPATIENT
Start: 2020-06-27 | End: 2020-12-28 | Stop reason: SDUPTHER

## 2020-10-19 ENCOUNTER — TELEPHONE (OUTPATIENT)
Dept: BARIATRICS | Facility: CLINIC | Age: 75
End: 2020-10-19

## 2020-10-20 ENCOUNTER — OFFICE VISIT (OUTPATIENT)
Dept: BARIATRICS | Facility: CLINIC | Age: 75
End: 2020-10-20
Payer: MEDICARE

## 2020-10-20 VITALS
SYSTOLIC BLOOD PRESSURE: 122 MMHG | BODY MASS INDEX: 38.04 KG/M2 | HEART RATE: 80 BPM | WEIGHT: 228.3 LBS | TEMPERATURE: 97.8 F | DIASTOLIC BLOOD PRESSURE: 84 MMHG | HEIGHT: 65 IN | RESPIRATION RATE: 17 BRPM

## 2020-10-20 DIAGNOSIS — K76.0 FATTY LIVER: ICD-10-CM

## 2020-10-20 DIAGNOSIS — E78.2 MIXED HYPERLIPIDEMIA: Primary | ICD-10-CM

## 2020-10-20 DIAGNOSIS — E55.9 VITAMIN D DEFICIENCY: ICD-10-CM

## 2020-10-20 DIAGNOSIS — E66.9 OBESITY, CLASS II, BMI 35-39.9: ICD-10-CM

## 2020-10-20 DIAGNOSIS — E03.9 HYPOTHYROIDISM IN ADULT: ICD-10-CM

## 2020-10-20 PROBLEM — E66.812 OBESITY, CLASS II, BMI 35-39.9: Status: ACTIVE | Noted: 2020-10-20

## 2020-10-20 PROCEDURE — 99203 OFFICE O/P NEW LOW 30 MIN: CPT | Performed by: INTERNAL MEDICINE

## 2020-10-27 ENCOUNTER — OFFICE VISIT (OUTPATIENT)
Dept: OBGYN CLINIC | Age: 75
End: 2020-10-27
Payer: MEDICARE

## 2020-10-27 VITALS
WEIGHT: 228.6 LBS | BODY MASS INDEX: 38.63 KG/M2 | DIASTOLIC BLOOD PRESSURE: 82 MMHG | TEMPERATURE: 97.7 F | SYSTOLIC BLOOD PRESSURE: 142 MMHG

## 2020-10-27 DIAGNOSIS — N63.10 LUMP OF RIGHT BREAST: Primary | ICD-10-CM

## 2020-10-27 PROCEDURE — 99203 OFFICE O/P NEW LOW 30 MIN: CPT | Performed by: NURSE PRACTITIONER

## 2020-10-29 ENCOUNTER — ANCILLARY ORDERS (OUTPATIENT)
Dept: FAMILY MEDICINE CLINIC | Facility: CLINIC | Age: 75
End: 2020-10-29

## 2020-10-29 DIAGNOSIS — R92.8 ABNORMAL MAMMOGRAM: ICD-10-CM

## 2020-10-29 DIAGNOSIS — R52 PAIN: ICD-10-CM

## 2020-10-29 DIAGNOSIS — N63.0 BREAST LUMP: ICD-10-CM

## 2020-10-30 ENCOUNTER — HOSPITAL ENCOUNTER (OUTPATIENT)
Dept: ULTRASOUND IMAGING | Facility: CLINIC | Age: 75
Discharge: HOME | End: 2020-10-30
Payer: MEDICARE

## 2020-10-30 ENCOUNTER — HOSPITAL ENCOUNTER (OUTPATIENT)
Dept: MAMMOGRAPHY | Facility: CLINIC | Age: 75
Discharge: HOME | End: 2020-10-30

## 2020-10-30 DIAGNOSIS — R92.8 ABNORMAL MAMMOGRAM: ICD-10-CM

## 2020-10-30 DIAGNOSIS — N63.10 LUMP OF RIGHT BREAST: ICD-10-CM

## 2020-10-30 PROCEDURE — 76642 ULTRASOUND BREAST LIMITED: CPT

## 2020-11-03 ENCOUNTER — TELEPHONE (OUTPATIENT)
Dept: FAMILY MEDICINE CLINIC | Facility: CLINIC | Age: 75
End: 2020-11-03

## 2020-11-03 ENCOUNTER — TELEPHONE (OUTPATIENT)
Dept: BARIATRICS | Facility: CLINIC | Age: 75
End: 2020-11-03

## 2020-11-04 ENCOUNTER — OFFICE VISIT (OUTPATIENT)
Dept: BARIATRICS | Facility: CLINIC | Age: 75
End: 2020-11-04
Payer: MEDICARE

## 2020-11-04 VITALS — WEIGHT: 229.4 LBS | HEIGHT: 65 IN | TEMPERATURE: 97.7 F | BODY MASS INDEX: 38.22 KG/M2

## 2020-11-04 DIAGNOSIS — R63.5 ABNORMAL WEIGHT GAIN: ICD-10-CM

## 2020-11-04 PROCEDURE — WMPRO12

## 2020-11-04 PROCEDURE — RECHECK

## 2020-11-11 ENCOUNTER — CLINICAL SUPPORT (OUTPATIENT)
Dept: BARIATRICS | Facility: CLINIC | Age: 75
End: 2020-11-11

## 2020-11-11 VITALS — BODY MASS INDEX: 37.15 KG/M2 | TEMPERATURE: 97.9 F | HEIGHT: 65 IN | WEIGHT: 223 LBS

## 2020-11-11 DIAGNOSIS — R63.5 ABNORMAL WEIGHT GAIN: Primary | ICD-10-CM

## 2020-11-11 PROCEDURE — RECHECK

## 2020-11-17 ENCOUNTER — TELEPHONE (OUTPATIENT)
Dept: BARIATRICS | Facility: CLINIC | Age: 75
End: 2020-11-17

## 2020-11-18 ENCOUNTER — CLINICAL SUPPORT (OUTPATIENT)
Dept: BARIATRICS | Facility: CLINIC | Age: 75
End: 2020-11-18

## 2020-11-18 ENCOUNTER — OFFICE VISIT (OUTPATIENT)
Dept: BARIATRICS | Facility: CLINIC | Age: 75
End: 2020-11-18

## 2020-11-18 VITALS — HEIGHT: 65 IN | TEMPERATURE: 97.1 F | WEIGHT: 223 LBS | BODY MASS INDEX: 37.15 KG/M2

## 2020-11-18 DIAGNOSIS — R63.5 ABNORMAL WEIGHT GAIN: Primary | ICD-10-CM

## 2020-11-18 PROCEDURE — RECHECK

## 2020-11-25 ENCOUNTER — CLINICAL SUPPORT (OUTPATIENT)
Dept: BARIATRICS | Facility: CLINIC | Age: 75
End: 2020-11-25

## 2020-11-25 VITALS — WEIGHT: 220.4 LBS | BODY MASS INDEX: 36.72 KG/M2 | HEIGHT: 65 IN

## 2020-11-25 DIAGNOSIS — R63.5 ABNORMAL WEIGHT GAIN: Primary | ICD-10-CM

## 2020-11-25 PROCEDURE — RECHECK

## 2020-11-29 ENCOUNTER — TELEPHONE (OUTPATIENT)
Dept: OTHER | Facility: OTHER | Age: 75
End: 2020-11-29

## 2020-12-02 ENCOUNTER — CLINICAL SUPPORT (OUTPATIENT)
Dept: BARIATRICS | Facility: CLINIC | Age: 75
End: 2020-12-02

## 2020-12-02 DIAGNOSIS — R63.5 ABNORMAL WEIGHT GAIN: Primary | ICD-10-CM

## 2020-12-02 PROCEDURE — RECHECK

## 2020-12-03 VITALS — BODY MASS INDEX: 36.57 KG/M2 | WEIGHT: 219.5 LBS | HEIGHT: 65 IN

## 2020-12-04 LAB
ALBUMIN SERPL-MCNC: 3.8 G/DL (ref 3.6–5.1)
ALBUMIN/GLOB SERPL: 1.3 (CALC) (ref 1–2.5)
ALP SERPL-CCNC: 77 U/L (ref 37–153)
ALT SERPL-CCNC: 13 U/L (ref 6–29)
AST SERPL-CCNC: 14 U/L (ref 10–35)
BILIRUB SERPL-MCNC: 0.6 MG/DL (ref 0.2–1.2)
BUN SERPL-MCNC: 14 MG/DL (ref 7–25)
BUN/CREAT SERPL: NORMAL (CALC) (ref 6–22)
CALCIUM SERPL-MCNC: 9.2 MG/DL (ref 8.6–10.4)
CHLORIDE SERPL-SCNC: 109 MMOL/L (ref 98–110)
CO2 SERPL-SCNC: 28 MMOL/L (ref 20–32)
CREAT SERPL-MCNC: 0.8 MG/DL (ref 0.6–0.93)
GLOBULIN SER CALC-MCNC: 3 G/DL (CALC) (ref 1.9–3.7)
GLUCOSE SERPL-MCNC: 90 MG/DL (ref 65–99)
POTASSIUM SERPL-SCNC: 4.2 MMOL/L (ref 3.5–5.3)
PROT SERPL-MCNC: 6.8 G/DL (ref 6.1–8.1)
SL AMB EGFR AFRICAN AMERICAN: 84 ML/MIN/1.73M2
SL AMB EGFR NON AFRICAN AMERICAN: 73 ML/MIN/1.73M2
SODIUM SERPL-SCNC: 142 MMOL/L (ref 135–146)
TSH SERPL-ACNC: 0.22 MIU/L (ref 0.4–4.5)

## 2020-12-09 ENCOUNTER — CLINICAL SUPPORT (OUTPATIENT)
Dept: BARIATRICS | Facility: CLINIC | Age: 75
End: 2020-12-09

## 2020-12-09 DIAGNOSIS — R63.5 ABNORMAL WEIGHT GAIN: Primary | ICD-10-CM

## 2020-12-09 PROCEDURE — RECHECK

## 2020-12-10 VITALS — HEIGHT: 65 IN | WEIGHT: 218.1 LBS | BODY MASS INDEX: 36.34 KG/M2

## 2020-12-16 ENCOUNTER — CLINICAL SUPPORT (OUTPATIENT)
Dept: BARIATRICS | Facility: CLINIC | Age: 75
End: 2020-12-16

## 2020-12-16 DIAGNOSIS — R63.5 ABNORMAL WEIGHT GAIN: Primary | ICD-10-CM

## 2020-12-16 PROCEDURE — RECHECK

## 2020-12-18 VITALS — HEIGHT: 65 IN | WEIGHT: 215.9 LBS | BODY MASS INDEX: 35.97 KG/M2

## 2020-12-23 ENCOUNTER — CLINICAL SUPPORT (OUTPATIENT)
Dept: BARIATRICS | Facility: CLINIC | Age: 75
End: 2020-12-23

## 2020-12-23 DIAGNOSIS — R63.5 ABNORMAL WEIGHT GAIN: Primary | ICD-10-CM

## 2020-12-23 PROCEDURE — RECHECK

## 2020-12-24 VITALS — WEIGHT: 215.6 LBS | HEIGHT: 65 IN | BODY MASS INDEX: 35.92 KG/M2

## 2020-12-28 ENCOUNTER — OFFICE VISIT (OUTPATIENT)
Dept: FAMILY MEDICINE CLINIC | Facility: CLINIC | Age: 75
End: 2020-12-28
Payer: MEDICARE

## 2020-12-28 VITALS
WEIGHT: 214 LBS | BODY MASS INDEX: 35.65 KG/M2 | TEMPERATURE: 98.2 F | DIASTOLIC BLOOD PRESSURE: 84 MMHG | SYSTOLIC BLOOD PRESSURE: 140 MMHG | HEART RATE: 76 BPM | HEIGHT: 65 IN | OXYGEN SATURATION: 98 %

## 2020-12-28 DIAGNOSIS — Z00.00 HEALTH CARE MAINTENANCE: Primary | ICD-10-CM

## 2020-12-28 DIAGNOSIS — E03.9 HYPOTHYROIDISM IN ADULT: ICD-10-CM

## 2020-12-28 DIAGNOSIS — L30.9 DERMATITIS: ICD-10-CM

## 2020-12-28 DIAGNOSIS — E78.2 MIXED HYPERLIPIDEMIA: ICD-10-CM

## 2020-12-28 PROCEDURE — 1123F ACP DISCUSS/DSCN MKR DOCD: CPT | Performed by: FAMILY MEDICINE

## 2020-12-28 PROCEDURE — G0439 PPPS, SUBSEQ VISIT: HCPCS | Performed by: FAMILY MEDICINE

## 2020-12-28 PROCEDURE — 99213 OFFICE O/P EST LOW 20 MIN: CPT | Performed by: FAMILY MEDICINE

## 2020-12-28 RX ORDER — PIMECROLIMUS 10 MG/G
CREAM TOPICAL 2 TIMES DAILY
Qty: 30 G | Refills: 1 | Status: SHIPPED | OUTPATIENT
Start: 2020-12-28

## 2020-12-30 ENCOUNTER — CLINICAL SUPPORT (OUTPATIENT)
Dept: BARIATRICS | Facility: CLINIC | Age: 75
End: 2020-12-30

## 2020-12-30 DIAGNOSIS — R63.5 ABNORMAL WEIGHT GAIN: Primary | ICD-10-CM

## 2020-12-30 PROCEDURE — RECHECK

## 2020-12-31 VITALS — WEIGHT: 213.2 LBS | BODY MASS INDEX: 35.52 KG/M2 | HEIGHT: 65 IN

## 2021-01-06 ENCOUNTER — CLINICAL SUPPORT (OUTPATIENT)
Dept: BARIATRICS | Facility: CLINIC | Age: 76
End: 2021-01-06

## 2021-01-06 DIAGNOSIS — R63.5 ABNORMAL WEIGHT GAIN: Primary | ICD-10-CM

## 2021-01-06 PROCEDURE — RECHECK

## 2021-01-07 VITALS — WEIGHT: 212.3 LBS | HEIGHT: 65 IN | BODY MASS INDEX: 35.37 KG/M2

## 2021-01-13 ENCOUNTER — OFFICE VISIT (OUTPATIENT)
Dept: BARIATRICS | Facility: CLINIC | Age: 76
End: 2021-01-13

## 2021-01-13 ENCOUNTER — CLINICAL SUPPORT (OUTPATIENT)
Dept: BARIATRICS | Facility: CLINIC | Age: 76
End: 2021-01-13

## 2021-01-13 VITALS — WEIGHT: 212.2 LBS | HEIGHT: 65 IN | TEMPERATURE: 97.5 F | BODY MASS INDEX: 35.35 KG/M2

## 2021-01-13 DIAGNOSIS — R63.5 ABNORMAL WEIGHT GAIN: Primary | ICD-10-CM

## 2021-01-13 PROCEDURE — RECHECK

## 2021-01-13 NOTE — PROGRESS NOTES
Weight Management Medical Nutrition Assessment  Patricia Escobedo was here today for her 2 month follow-up in the [facility]  Solo Christina she weighs 212 2 lbs giving her a loss of 9 8 lbs in 5 weeks  She has been consistently logging her food and reports that is what kept her on track during the holidays  She has increased her exercise to 4 times per week  She will either walk on her treadmill or take a virtual antonio class         Patient seen by Medical Provider in past 6 months:  yes  Requested to schedule appointment with Medical Provider: No        Anthropometric Measurements  Start Weight (#):229 4 lbs  Current Weight (#): 212 20  lbs  TBW % Change from start weight: 8 %  Ideal Body Weight (#):122 5  Goal Weight (#): 182 lbs     Weight Loss History  Previous weight loss attempts: Commercial Programs (Weight Watchers, [employer]if[employer] James, etc )     Food and Nutrition Related History  Wake up: 7:30  Bed Time:midnight     Food Recall  Breakfast: coffee, yogurt and 1/4c granola (prune juice if needed)             Snack: none (apple if needed)  Lunch: fruit and cottage cheese  Snack:   Dinner: lean cuisine with salad or lean meat, veg and 1/2 sweet potatoe  Snack: 40 calorie hot chocolate or skinny pop         Beverages: water, juice, diet soda and decaf coffee/tea  Volume of beverage intake: 20 oz  Trying to increase daily     Weekends: Same  Cravings: varies (sometimes chips and sometimes cookies)  Trouble area of day: after dinner     Frequency of Eating out: 1 -2 times per week   (chinese, Martiniquais or pizza)  Food restrictions:none  Cooking: self   Food Shopping: self     Physical Activity Intake  Activity:walking and virtual zoom classes  Frequency:  (3 - 4 times per week for 30 minutes )  Physical limitations/barriers to exercise: none     Estimated Needs  Energy     Bear Rena Energy Needs:  BMR : 5092  8# loss weekly sedentary: 6202             8-1# loss weekly lightly active: 1001 - 1501  Maintenance calories for sedentary activity level: 1747  Protein:66 - 83     (1 2-1 5g/kg IBW)  Fluid: 64 oz     (35mL/kg IBW)     Nutrition Diagnosis  Yes;    Overweight/obesity  related to Excess energy intake as evidenced by  BMI more than normative standard for age and sex (obesity-grade II 35-39  9)     Nutrition Intervention     Nutrition Prescription  Calories:1100 -1200  Protein:66 - 80  Fluid:64     Meal Plan (Colin/Pro/Carb)  Breakfast:  200/20/30  Snack:  Lunch: 300/20/30  Snack: 150/10/15  Dinner: 300/28/30  Snack:150/10/15     Nutrition Education:    Healthy Core Manual  Calorie controlled menu  Lean protein food choices  Healthy snack options  Food journaling tips        Nutrition Counseling:  Strategies: meal planning, portion sizes, healthy snack choices, hydration, fiber intake, protein intake, exercise, food journal        Monitoring and Evaluation:  Evaluation criteria:  Energy Intake  Meet protein needs  Maintain adequate hydration  Monitor weekly weight  Meal planning/preparation  Food journal   Decreased portions at mealtimes and snacks  Physical activity      Barriers to learning:none  Readiness to change: Action:  (Changing behavior)  Comprehension: good  Expected Compliance: good

## 2021-01-14 VITALS — WEIGHT: 212.2 LBS | BODY MASS INDEX: 35.35 KG/M2 | HEIGHT: 65 IN

## 2021-01-14 NOTE — PROGRESS NOTES
Bariatric Behavioral Health Evaluation    Presenting Problem: 77year old female ( 1945) here for behavioral health evaluation for [facility]  Patient had initial [facility] consult 10/2020  Pre-morbid level of function and history of present illness: Diagnosis of hypothyroidism, hyperlipidemia; patient reports struggling with her weight for about 20 years  Patient attributes this to getting hives and taking steroids as well as getting older  Psychiatric/Psychological Treatment Diagnosis: Patient reports history of depression after only son had passed unexpectedly in   Patient reports that she participated in a grief support group in the past  Patient denies any current symptoms  Outpatient Counselor No     Psychiatrist No     Have you had Inpatient Treatment? No    Domestic Violence No    Abuse History:  Patient denies this  Additional comments/stressors related to family/relationships/peer support: Patient identifies her sister, sister and law, and her  as her support  Patient identifies her 's health as current stressors  Physical/Psychological Assessment:     Appearance: appropriate  Sociability: friendly  Affect: appropriate  Mood: calm  Thought Process: coherent  Speech: normal  Content: no impairment  Orientation: person  Yes , place  Yes , time  Yes , normal attention span  Yes , normal memory  Yes   and normal judgement  Yes   Insight: emotional  good    Risk Assessment:     none    Recommendations: Goals discussed: 1  protein with meals 2  try to go to bed earlier 2  increase water-also having it earlier in the day 3  increase activity HC LCSW    Risk of Harm to Self or Others: Denies SI or HI    Observation:     Interviews This interview only      Based on the previous information, the client presents the following risk of harm to self or others: low     Note: Goals discussed: 1  protein with meals 2  try to go to bed earlier 2  increase water-also having it earlier in the day 3   increase activity HC LCSW

## 2021-01-15 ENCOUNTER — OFFICE VISIT (OUTPATIENT)
Dept: BARIATRICS | Facility: CLINIC | Age: 76
End: 2021-01-15

## 2021-01-15 DIAGNOSIS — R63.5 ABNORMAL WEIGHT GAIN: Primary | ICD-10-CM

## 2021-01-15 PROCEDURE — RECHECK

## 2021-01-20 ENCOUNTER — CLINICAL SUPPORT (OUTPATIENT)
Dept: BARIATRICS | Facility: CLINIC | Age: 76
End: 2021-01-20

## 2021-01-20 DIAGNOSIS — R63.5 ABNORMAL WEIGHT GAIN: Primary | ICD-10-CM

## 2021-01-20 PROCEDURE — RECHECK

## 2021-01-21 VITALS — BODY MASS INDEX: 35.02 KG/M2 | HEIGHT: 65 IN | WEIGHT: 210.2 LBS

## 2021-01-27 ENCOUNTER — CLINICAL SUPPORT (OUTPATIENT)
Dept: BARIATRICS | Facility: CLINIC | Age: 76
End: 2021-01-27

## 2021-01-27 DIAGNOSIS — R63.5 ABNORMAL WEIGHT GAIN: Primary | ICD-10-CM

## 2021-01-27 PROCEDURE — RECHECK

## 2021-01-28 VITALS — HEIGHT: 65 IN | BODY MASS INDEX: 34.84 KG/M2 | WEIGHT: 209.1 LBS

## 2021-02-03 ENCOUNTER — CLINICAL SUPPORT (OUTPATIENT)
Dept: BARIATRICS | Facility: CLINIC | Age: 76
End: 2021-02-03

## 2021-02-03 DIAGNOSIS — R63.5 ABNORMAL WEIGHT GAIN: Primary | ICD-10-CM

## 2021-02-03 PROCEDURE — RECHECK

## 2021-02-04 VITALS — BODY MASS INDEX: 34.75 KG/M2 | WEIGHT: 208.6 LBS | HEIGHT: 65 IN

## 2021-02-10 ENCOUNTER — CLINICAL SUPPORT (OUTPATIENT)
Dept: BARIATRICS | Facility: CLINIC | Age: 76
End: 2021-02-10

## 2021-02-10 DIAGNOSIS — R63.5 ABNORMAL WEIGHT GAIN: Primary | ICD-10-CM

## 2021-02-10 PROCEDURE — RECHECK

## 2021-02-11 VITALS — WEIGHT: 208.5 LBS | HEIGHT: 65 IN | BODY MASS INDEX: 34.74 KG/M2

## 2021-02-17 ENCOUNTER — CLINICAL SUPPORT (OUTPATIENT)
Dept: BARIATRICS | Facility: CLINIC | Age: 76
End: 2021-02-17

## 2021-02-17 DIAGNOSIS — R63.5 ABNORMAL WEIGHT GAIN: Primary | ICD-10-CM

## 2021-02-17 PROCEDURE — RECHECK

## 2021-02-18 VITALS — WEIGHT: 207.5 LBS | HEIGHT: 65 IN | BODY MASS INDEX: 34.57 KG/M2

## 2021-02-24 ENCOUNTER — CLINICAL SUPPORT (OUTPATIENT)
Dept: BARIATRICS | Facility: CLINIC | Age: 76
End: 2021-02-24

## 2021-02-24 DIAGNOSIS — R63.5 ABNORMAL WEIGHT GAIN: Primary | ICD-10-CM

## 2021-02-24 PROCEDURE — RECHECK

## 2021-02-25 VITALS — BODY MASS INDEX: 34.41 KG/M2 | HEIGHT: 65 IN | WEIGHT: 206.5 LBS

## 2021-03-03 ENCOUNTER — CLINICAL SUPPORT (OUTPATIENT)
Dept: BARIATRICS | Facility: CLINIC | Age: 76
End: 2021-03-03

## 2021-03-03 VITALS — HEIGHT: 65 IN | WEIGHT: 206.2 LBS | BODY MASS INDEX: 34.35 KG/M2

## 2021-03-03 DIAGNOSIS — R63.5 ABNORMAL WEIGHT GAIN: Primary | ICD-10-CM

## 2021-03-03 PROCEDURE — RECHECK

## 2021-03-10 ENCOUNTER — CLINICAL SUPPORT (OUTPATIENT)
Dept: BARIATRICS | Facility: CLINIC | Age: 76
End: 2021-03-10

## 2021-03-10 DIAGNOSIS — R63.5 ABNORMAL WEIGHT GAIN: Primary | ICD-10-CM

## 2021-03-10 PROCEDURE — RECHECK

## 2021-03-11 VITALS — BODY MASS INDEX: 34.85 KG/M2 | HEIGHT: 65 IN

## 2021-03-17 ENCOUNTER — CLINICAL SUPPORT (OUTPATIENT)
Dept: BARIATRICS | Facility: CLINIC | Age: 76
End: 2021-03-17

## 2021-03-17 VITALS — BODY MASS INDEX: 34.32 KG/M2 | HEIGHT: 65 IN | WEIGHT: 206 LBS

## 2021-03-17 DIAGNOSIS — R63.5 ABNORMAL WEIGHT GAIN: Primary | ICD-10-CM

## 2021-03-17 PROCEDURE — RECHECK

## 2021-03-24 ENCOUNTER — CLINICAL SUPPORT (OUTPATIENT)
Dept: BARIATRICS | Facility: CLINIC | Age: 76
End: 2021-03-24

## 2021-03-24 DIAGNOSIS — R63.5 ABNORMAL WEIGHT GAIN: Primary | ICD-10-CM

## 2021-03-24 PROCEDURE — RECHECK

## 2021-03-25 VITALS — HEIGHT: 65 IN | WEIGHT: 205 LBS | BODY MASS INDEX: 34.16 KG/M2

## 2021-04-16 ENCOUNTER — TELEPHONE (OUTPATIENT)
Dept: FAMILY MEDICINE CLINIC | Facility: CLINIC | Age: 76
End: 2021-04-16

## 2021-04-16 DIAGNOSIS — E03.9 HYPOTHYROIDISM IN ADULT: Primary | ICD-10-CM

## 2021-04-16 NOTE — TELEPHONE ENCOUNTER
Pt called and wants to know if you will be needing to adjust her synthroid, pt reports she been feeling dissy  [phone]

## 2021-04-19 ENCOUNTER — TELEPHONE (OUTPATIENT)
Dept: FAMILY MEDICINE CLINIC | Facility: CLINIC | Age: 76
End: 2021-04-19

## 2021-04-19 DIAGNOSIS — E03.9 HYPOTHYROIDISM IN ADULT: Primary | ICD-10-CM

## 2021-04-19 DIAGNOSIS — E03.9 HYPOTHYROIDISM IN ADULT: ICD-10-CM

## 2021-04-19 RX ORDER — LEVOTHYROXINE SODIUM 0.07 MG/1
TABLET ORAL
Qty: 30 TABLET | Refills: 1 | Status: SHIPPED | OUTPATIENT
Start: 2021-04-19 | End: 2021-04-19

## 2021-04-19 RX ORDER — LEVOTHYROXINE SODIUM 0.07 MG/1
75 TABLET ORAL DAILY
Qty: 30 TABLET | Refills: 1 | Status: SHIPPED | OUTPATIENT
Start: 2021-04-19 | End: 2021-04-19

## 2021-04-19 RX ORDER — LEVOTHYROXINE SODIUM 75 MCG
75 TABLET ORAL DAILY
Qty: 30 TABLET | Refills: 3 | Status: SHIPPED | OUTPATIENT
Start: 2021-04-19 | End: 2021-07-02 | Stop reason: SDUPTHER

## 2021-04-19 NOTE — TELEPHONE ENCOUNTER
Pt called stating she picked up her script and is for generic, stating she always takes Synthroid  Please resend

## 2021-04-19 NOTE — TELEPHONE ENCOUNTER
Pt had a Moderna vaccine, had some dizziness   Pt also wondering about her FBW resutls from 3/30/21 from [address] [facility] [place]  She is experiencing Excessive hair fallout  [phone] or [phone]

## 2021-05-05 ENCOUNTER — OFFICE VISIT (OUTPATIENT)
Dept: BARIATRICS | Facility: CLINIC | Age: 76
End: 2021-05-05

## 2021-05-05 VITALS — HEIGHT: 65 IN | WEIGHT: 208 LBS | BODY MASS INDEX: 34.66 KG/M2

## 2021-05-05 DIAGNOSIS — R63.5 ABNORMAL WEIGHT GAIN: Primary | ICD-10-CM

## 2021-05-05 PROCEDURE — RECHECK

## 2021-05-05 NOTE — PROGRESS NOTES
Weight Management Medical Nutrition Assessment  Crystal Acuña was here today for her 3 month follow-up in the [facility]   (She has been in [place] for the last few months so she is now just [employer]  She will taking the REE metabolism test separately next week )  Today she weighs 208 giving her a loss of 4 4 lbs since her 2 month visit  She recently had her thyroid medication adjusted after having some hair loss  She is being followed by her PCP  Since being back in [place], she has not been walking as much as when she was in [facility] but she did just join a hiking group and will be hiking with them 2 times per week  She plans to start [employer] classes again       Patient seen by Medical Provider in past 6 months:  yes  Requested to schedule appointment with Medical Provider: No        Anthropometric Measurements  Start Weight (#):229 4 lbs  Current Weight (#): 2  lbs  TBW % Change from start weight: 9%  Ideal Body Weight (#):122 5  Goal Weight (#): 182 lbs     Weight Loss History  Previous weight loss attempts: Commercial Programs (Weight Watchers, Renelda Cancel, etc )     Food and Nutrition Related History  Wake up: 7:30  Bed Time:midnight     Food Recall  Breakfast: coffee, yogurt and 1/4c granola (prune juice if needed)             Snack: none (apple if needed)  Lunch: fruit and cottage cheese  Snack:   Dinner: roast beef, mashed potatoes, jennifer greens  Snack: fruit tart         Beverages: water, juice, diet soda and decaf coffee/tea  Volume of beverage intake: 20 oz  Trying to increase daily     Weekends: Same  Cravings: varies (sometimes chips and sometimes cookies)  Trouble area of day: after dinner     Frequency of Eating out: 1 -2 times per week   (chinese, Portuguese or pizza)  Food restrictions:none  Cooking: self   Food Shopping: self     Physical Activity Intake  Activity:walking and virtual zoom classes  Frequency:  (3 - 4 times per week for 30 minutes )  Physical limitations/barriers to exercise: none     Estimated Needs  Energy     [address]  [facility] [place] Needs: BMR : 2833  1# loss weekly sedentary: 1224            1-2# loss weekly lightly active: 975 - 1475  Maintenance calories for sedentary activity level: 1724  Protein:66 - 83     (1 2-1 5g/kg IBW)  Fluid: 64 oz     (35mL/kg IBW)     Nutrition Diagnosis  Yes;    Overweight/obesity  related to Excess energy intake as evidenced by  BMI more than normative standard for age and sex (obesity-grade II 35-39  9)     Nutrition Intervention     Nutrition Prescription  Calories:1100 -1200  Protein:66 - 80  Fluid:64     Meal Plan (Colin/Pro/Carb)  Breakfast:  200/20/30  Snack:  Lunch: 300/20/30  Snack: 150/10/15  Dinner: 300/28/30  Snack:150/10/15     Nutrition Education:    [facility] Manual  Calorie controlled menu  Lean protein food choices  Healthy snack options  Food journaling tips        Nutrition Counseling:  Strategies: meal planning, portion sizes, healthy snack choices, hydration, fiber intake, protein intake, exercise, food journal        Monitoring and Evaluation:  Evaluation criteria:  Energy Intake  Meet protein needs  Maintain adequate hydration  Monitor weekly weight  Meal planning/preparation  Food journal   Decreased portions at mealtimes and snacks  Physical activity      Barriers to learning:none  Readiness to change: Action:  (Changing behavior)  Comprehension: good  Expected Compliance: good

## 2021-05-12 ENCOUNTER — CLINICAL SUPPORT (OUTPATIENT)
Dept: BARIATRICS | Facility: CLINIC | Age: 76
End: 2021-05-12

## 2021-05-12 DIAGNOSIS — R63.5 ABNORMAL WEIGHT GAIN: Primary | ICD-10-CM

## 2021-05-12 PROCEDURE — RECHECK

## 2021-05-13 VITALS — BODY MASS INDEX: 35.02 KG/M2 | WEIGHT: 210.2 LBS | HEIGHT: 65 IN

## 2021-05-19 ENCOUNTER — OFFICE VISIT (OUTPATIENT)
Dept: BARIATRICS | Facility: CLINIC | Age: 76
End: 2021-05-19

## 2021-05-19 ENCOUNTER — CLINICAL SUPPORT (OUTPATIENT)
Dept: BARIATRICS | Facility: CLINIC | Age: 76
End: 2021-05-19

## 2021-05-19 DIAGNOSIS — R63.5 ABNORMAL WEIGHT GAIN: Primary | ICD-10-CM

## 2021-05-19 PROCEDURE — RECHECK

## 2021-05-19 NOTE — PROGRESS NOTES
Reevue indirect calorimter revealed REE is (-8 )compared to the predictive normal for someone her same age, height, and gender  This was from when she was in [facility] she had finished and she was just having the REE today  Her weight today is 209 3 lbs

## 2021-05-21 VITALS — HEIGHT: 65 IN | BODY MASS INDEX: 34.87 KG/M2 | WEIGHT: 209.3 LBS

## 2021-05-26 ENCOUNTER — CLINICAL SUPPORT (OUTPATIENT)
Dept: BARIATRICS | Facility: CLINIC | Age: 76
End: 2021-05-26

## 2021-05-26 DIAGNOSIS — R63.5 ABNORMAL WEIGHT GAIN: Primary | ICD-10-CM

## 2021-05-26 PROCEDURE — RECHECK

## 2021-05-27 VITALS — BODY MASS INDEX: 35.37 KG/M2 | HEIGHT: 65 IN

## 2021-06-01 ENCOUNTER — TELEPHONE (OUTPATIENT)
Dept: FAMILY MEDICINE CLINIC | Facility: CLINIC | Age: 76
End: 2021-06-01

## 2021-06-03 NOTE — TELEPHONE ENCOUNTER
Pt called saying she is on her way to [address] [place] [address] to get bw drawn verifying if orders were sent to Aleyda Chun in on 06/01/2021

## 2021-06-09 ENCOUNTER — OFFICE VISIT (OUTPATIENT)
Dept: BARIATRICS | Facility: CLINIC | Age: 76
End: 2021-06-09

## 2021-06-09 DIAGNOSIS — R63.5 ABNORMAL WEIGHT GAIN: Primary | ICD-10-CM

## 2021-06-09 PROCEDURE — RECHECK

## 2021-06-09 NOTE — PROGRESS NOTES
Weight Management Medical Nutrition Assessment  Meron Perez was here today for her RD visit in the [facility] program   Today she weighs 210 8 lbs giving her a gain of  6 lbs since her last visit        in the [facility]   (She has been in [place] for the last few months so she is now just [employer]  She will taking the REE metabolism test separately next week )  Today she weighs 208 giving her a loss of 4 4 lbs since her 2 month visit  She recently had her thyroid medication adjusted after having some hair loss  She is being followed by her PCP  Since being back in [place], she has not been walking as much as when she was in [facility] but she did just join a hiking group and will be hiking with them 2 times per week    She plans to start [employer] classes again       Patient seen by Medical Provider in past 6 months:  yes  Requested to schedule appointment with Medical Provider: No        Anthropometric Measurements  Start Weight (#):229 4 lbs  Current Weight (#): 2  lbs  TBW % Change from start weight: 9%  Ideal Body Weight (#):122 5  Goal Weight (#): 182 lbs     Weight Loss History  Previous weight loss attempts: Commercial Programs (Weight Watchers, [employer], etc )     Food and Nutrition Related History  Wake up: 7:30  Bed Time:midnight     Food Recall  Breakfast: coffee, yogurt and 1/4c granola (prune juice if needed)             Snack: none (apple if needed)  Lunch: fruit and cottage cheese  Snack:   Dinner: roast beef, mashed potatoes, jennifer greens  Snack: fruit tart         Beverages: water, juice, diet soda and decaf coffee/tea  Volume of beverage intake: 20 oz  Trying to increase daily     Weekends: Same  Cravings: varies (sometimes chips and sometimes cookies)  Trouble area of day: after dinner     Frequency of Eating out: 1 -2 times per week   (chinese, Bulgarian or pizza)  Food restrictions:none  Cooking: self   Food Shopping: self     Physical Activity Intake  Activity:walking and virtual zoom classes  Frequency:  (3 - 4 times per week for 30 minutes )  Physical limitations/barriers to exercise: none     Estimated Needs  Energy     [address]  [place] Needs: BMR : 0023  1# loss weekly sedentary: 1224            1-2# loss weekly lightly active: 975 - 1475  Maintenance calories for sedentary activity level: 1724  Protein:66 - 83     (1 2-1 5g/kg IBW)  Fluid: 64 oz     (35mL/kg IBW)     Nutrition Diagnosis  Yes;    Overweight/obesity  related to Excess energy intake as evidenced by  BMI more than normative standard for age and sex (obesity-grade II 35-39  9)     Nutrition Intervention     Nutrition Prescription  Calories:1100 -1200  Protein:66 - 80  Fluid:64     Meal Plan (Colin/Pro/Carb)  Breakfast:  200/20/30  Snack:  Lunch: 300/20/30  Snack: 150/10/15  Dinner: 300/28/30  Snack:150/10/15     Nutrition Education:    Healthy [facility] Manual  Calorie controlled menu  Lean protein food choices  Healthy snack options  Food journaling tips        Nutrition Counseling:  Strategies: meal planning, portion sizes, healthy snack choices, hydration, fiber intake, protein intake, exercise, food journal        Monitoring and Evaluation:  Evaluation criteria:  Energy Intake  Meet protein needs  Maintain adequate hydration  Monitor weekly weight  Meal planning/preparation  Food journal   Decreased portions at mealtimes and snacks  Physical activity      Barriers to learning:none  Readiness to change: Action:  (Changing behavior)  Comprehension: good  Expected Compliance: good

## 2021-06-14 ENCOUNTER — TELEPHONE (OUTPATIENT)
Dept: BARIATRICS | Facility: CLINIC | Age: 76
End: 2021-06-14

## 2021-07-02 ENCOUNTER — TELEPHONE (OUTPATIENT)
Dept: FAMILY MEDICINE CLINIC | Facility: CLINIC | Age: 76
End: 2021-07-02

## 2021-07-02 ENCOUNTER — OFFICE VISIT (OUTPATIENT)
Dept: FAMILY MEDICINE CLINIC | Facility: CLINIC | Age: 76
End: 2021-07-02
Payer: MEDICARE

## 2021-07-02 VITALS
BODY MASS INDEX: 35.32 KG/M2 | WEIGHT: 212 LBS | OXYGEN SATURATION: 97 % | SYSTOLIC BLOOD PRESSURE: 116 MMHG | DIASTOLIC BLOOD PRESSURE: 82 MMHG | HEART RATE: 72 BPM | HEIGHT: 65 IN | TEMPERATURE: 98.2 F

## 2021-07-02 DIAGNOSIS — H53.9 CHANGE IN VISION: ICD-10-CM

## 2021-07-02 DIAGNOSIS — E03.9 HYPOTHYROIDISM IN ADULT: ICD-10-CM

## 2021-07-02 DIAGNOSIS — E78.2 MIXED HYPERLIPIDEMIA: ICD-10-CM

## 2021-07-02 DIAGNOSIS — E03.9 HYPOTHYROIDISM IN ADULT: Primary | ICD-10-CM

## 2021-07-02 PROCEDURE — 99214 OFFICE O/P EST MOD 30 MIN: CPT | Performed by: FAMILY MEDICINE

## 2021-07-02 RX ORDER — LEVOTHYROXINE SODIUM 75 MCG
75 TABLET ORAL DAILY
Qty: 90 TABLET | Refills: 3 | Status: SHIPPED | OUTPATIENT
Start: 2021-07-02 | End: 2022-06-09

## 2021-07-02 NOTE — PROGRESS NOTES
Assessment/Plan:         Problem List Items Addressed This Visit        Endocrine    Hypothyroidism in adult - Primary     Continue levothyroxine, check TSH in 6 months  Relevant Orders    TSH, 3rd generation       Other    Mixed hyperlipidemia     Continue to watch her diet, check CMP, lipid profile in 6 months  Relevant Orders    CBC    Comprehensive metabolic panel    Lipid panel      Other Visit Diagnoses     Change in vision        Relevant Orders    Ambulatory referral to Ophthalmology            Subjective:      Patient ID: Westley Heath is a 77 y o  female  She comes in today for checkup on her hypothyroidism, hyperlipidemia  She does complain of a cloudy appearance in her right eye  She has had this for several months  She did see an optometrist while in [place] and was told to see an ophthalmologist   She is seeking advice for who she should see  She continues take her levothyroxine, she did have her blood work done and this was reviewed with her today  The following portions of the patient's history were reviewed and updated as appropriate:   Past Medical History:  She has a past medical history of Colon polyp, Disease of thyroid gland, No known problems, Obesity (BMI 30-39 9), and Obesity (BMI 30 0-34 9)  ,  _______________________________________________________________________  Medical Problems:  does not have any pertinent problems on file ,  _______________________________________________________________________  Past Surgical History:   has a past surgical history that includes Gallbladder surgery; Colonoscopy; and Cholecystectomy  ,  _______________________________________________________________________  Family History:  family history includes Breast cancer in her sister; Cancer in her sister; Diabetes in her mother; Heart attack in her son; Heart disease in her father; No Known Problems in her brother, sister, and sister; Stroke in her mother;  Thyroid disease in her other and sister  ,  _______________________________________________________________________  Social History:   reports that she has never smoked  She has never used smokeless tobacco  She reports current alcohol use  She reports that she does not use drugs  ,  _______________________________________________________________________  Allergies:  is allergic to iodine - food allergy, levofloxacin, shellfish allergy - food allergy, and soy isoflavones     _______________________________________________________________________  Current Outpatient Medications   Medication Sig Dispense Refill    pimecrolimus (Elidel) 1 % cream Apply topically 2 (two) times a day 30 g 1    Synthroid 75 MCG tablet Take 1 tablet (75 mcg total) by mouth daily 30 tablet 3     No current facility-administered medications for this visit      _______________________________________________________________________  Review of Systems      Objective:  Vitals:    07/02/21 1139   BP: 116/82   BP Location: Left arm   Patient Position: Sitting   Cuff Size: Large   Pulse: 72   Temp: 98 2 °F (36 8 °C)   SpO2: 97%   Weight: 96 2 kg (212 lb)   Height: 5' 4 5" (1 638 m)     Body mass index is 35 83 kg/m²       Physical Exam

## 2021-07-02 NOTE — TELEPHONE ENCOUNTER
Upon checking out pt is asking to resend script for Synthroid 75 MCG tablet for    quantity of 90 with 3 refills until pt comes back from [place] and travel reason

## 2021-07-06 ENCOUNTER — TELEPHONE (OUTPATIENT)
Dept: ADMINISTRATIVE | Facility: OTHER | Age: 76
End: 2021-07-06

## 2021-07-06 NOTE — TELEPHONE ENCOUNTER
Upon review of the In Basket request we were able to locate, review, and update the patient chart as requested for Mammogram     Any additional questions or concerns should be emailed to the Practice Liaisons via [email or web address] email, please do not reply via In Basket      Thank you  Elizabeth Conrad

## 2021-07-06 NOTE — TELEPHONE ENCOUNTER
----- Message from Anita Steele sent at 7/2/2021 11:43 AM EDT -----  Regarding: Carlo Temple  07/02/21 11:43 AM    Jorge, our patient Cata Rosenberg has had Mammogram completed/performed  Please assist in updating the patient chart by pulling the Care Everywhere (CE) document  The date of service is 9/2020       Thank you,  Anita Ng [phone]

## 2021-07-07 ENCOUNTER — CLINICAL SUPPORT (OUTPATIENT)
Dept: BARIATRICS | Facility: CLINIC | Age: 76
End: 2021-07-07

## 2021-07-07 DIAGNOSIS — R63.5 ABNORMAL WEIGHT GAIN: Primary | ICD-10-CM

## 2021-07-07 PROCEDURE — RECHECK

## 2021-07-08 VITALS — BODY MASS INDEX: 35.83 KG/M2 | HEIGHT: 65 IN

## 2021-07-21 ENCOUNTER — CLINICAL SUPPORT (OUTPATIENT)
Dept: BARIATRICS | Facility: CLINIC | Age: 76
End: 2021-07-21

## 2021-07-21 VITALS — WEIGHT: 214.6 LBS | HEIGHT: 65 IN | BODY MASS INDEX: 35.75 KG/M2

## 2021-07-21 DIAGNOSIS — R63.5 ABNORMAL WEIGHT GAIN: Primary | ICD-10-CM

## 2021-07-21 PROCEDURE — RECHECK

## 2021-07-28 ENCOUNTER — CLINICAL SUPPORT (OUTPATIENT)
Dept: BARIATRICS | Facility: CLINIC | Age: 76
End: 2021-07-28

## 2021-07-28 VITALS — WEIGHT: 217.2 LBS | BODY MASS INDEX: 36.19 KG/M2 | HEIGHT: 65 IN

## 2021-07-28 DIAGNOSIS — R63.5 ABNORMAL WEIGHT GAIN: Primary | ICD-10-CM

## 2021-07-28 PROCEDURE — RECHECK

## 2021-08-19 ENCOUNTER — OFFICE VISIT (OUTPATIENT)
Dept: BARIATRICS | Facility: CLINIC | Age: 76
End: 2021-08-19

## 2021-08-19 DIAGNOSIS — R63.5 ABNORMAL WEIGHT GAIN: Primary | ICD-10-CM

## 2021-08-19 PROCEDURE — RECHECK

## 2021-08-19 NOTE — PROGRESS NOTES
Weight Management Medical Nutrition Assessment  Celia Luu was here today for her RD visit in the [facility] program   Today she weighs 216 4 lbs giving her a loss of  6 lbs since her last visit  She admits that she is "still off track "  Reviewed calories, food logging and portion sizes  She is continue to hike weekly with her hiking group  She would like to be put on hold for the next month or so since she will be having surgery for her cataracts  She will call and let us know when she wants to start again        Patient seen by Medical Provider in past 6 months:  yes  Requested to schedule appointment with Medical Provider: No        Anthropometric Measurements  Start Weight (#):229 4 lbs  Current Weight (#): 216 4  lbs  TBW % Change from start weight: 6%  Ideal Body Weight (#):122 5  Goal Weight (#): 182 lbs     Weight Loss History  Previous weight loss attempts: Commercial Programs (Weight Watchers, [place], etc )     Food and Nutrition Related History  Wake up: 7:30  Bed Time:midnight     Food Recall  Breakfast: coffee, yogurt and 1/4c granola (prune juice if needed)             Snack: none (apple if needed)  Lunch: fruit and cottage cheese  Snack:   Dinner: roast beef, mashed potatoes, jennifer greens  Snack: fruit tart         Beverages: water, juice, diet soda and decaf coffee/tea  Volume of beverage intake: 20 oz  Trying to increase daily     Weekends: Same  Cravings: varies (sometimes chips and sometimes cookies)  Trouble area of day: after dinner     Frequency of Eating out: 1 -2 times per week   (chinese, Danish or pizza)  Food restrictions:none  Cooking: self   Food Shopping: self     Physical Activity Intake  Activity:walking and virtual zoom classes  Frequency:  (3 - 4 times per week for 30 minutes )  Physical limitations/barriers to exercise: none     Estimated Needs  Energy     [employer] Needs:  BMR : 3133  1# loss weekly sedentary: 1224            1-2# loss weekly lightly active: 975 - 7641  Maintenance calories for sedentary activity level: 1724  Protein:66 - 83     (1 2-1 5g/kg IBW)  Fluid: 64 oz     (35mL/kg IBW)     Nutrition Diagnosis  Yes;    Overweight/obesity  related to Excess energy intake as evidenced by  BMI more than normative standard for age and sex (obesity-grade II 35-39  9)     Nutrition Intervention     Nutrition Prescription  Calories:1100 -1200  Protein:66 - 80  Fluid:64     Meal Plan (Colin/Pro/Carb)  Breakfast:  200/20/30  Snack:  Lunch: 300/20/30  Snack: 150/10/15  Dinner: 300/28/30  Snack:150/10/15     Nutrition Education:    Healthy [facility] Manual  Calorie controlled menu  Lean protein food choices  Healthy snack options  Food journaling tips        Nutrition Counseling:  Strategies: meal planning, portion sizes, healthy snack choices, hydration, fiber intake, protein intake, exercise, food journal        Monitoring and Evaluation:  Evaluation criteria:  Energy Intake  Meet protein needs  Maintain adequate hydration  Monitor weekly weight  Meal planning/preparation  Food journal   Decreased portions at mealtimes and snacks  Physical activity      Barriers to learning:none  Readiness to change: Action:  (Changing behavior)  Comprehension: good  Expected Compliance: good

## 2021-09-09 ENCOUNTER — OFFICE VISIT (OUTPATIENT)
Dept: FAMILY MEDICINE CLINIC | Facility: CLINIC | Age: 76
End: 2021-09-09
Payer: MEDICARE

## 2021-09-09 VITALS
DIASTOLIC BLOOD PRESSURE: 74 MMHG | HEART RATE: 72 BPM | WEIGHT: 218 LBS | HEIGHT: 65 IN | OXYGEN SATURATION: 96 % | SYSTOLIC BLOOD PRESSURE: 122 MMHG | BODY MASS INDEX: 36.32 KG/M2 | TEMPERATURE: 98.4 F

## 2021-09-09 DIAGNOSIS — H25.11 NUCLEAR SCLEROTIC CATARACT OF RIGHT EYE: Primary | ICD-10-CM

## 2021-09-09 DIAGNOSIS — Z01.818 PRE-OP EXAMINATION: ICD-10-CM

## 2021-09-09 PROBLEM — H25.9 AGE-RELATED CATARACT OF LEFT EYE: Status: ACTIVE | Noted: 2021-09-09

## 2021-09-09 PROCEDURE — 99214 OFFICE O/P EST MOD 30 MIN: CPT | Performed by: PHYSICIAN ASSISTANT

## 2021-09-09 RX ORDER — TRIPROLIDINE/PSEUDOEPHEDRINE 2.5MG-60MG
TABLET ORAL
COMMUNITY
Start: 2021-08-26 | End: 2022-04-08

## 2021-09-09 RX ORDER — OFLOXACIN 3 MG/ML
SOLUTION/ DROPS OPHTHALMIC
COMMUNITY
Start: 2021-08-26 | End: 2022-04-08

## 2021-09-09 NOTE — PROGRESS NOTES
Subjective:     Karlene Zhu is a 77 y o  female who presents to the office today for a preoperative consultation at the request of surgeon Alyx Laboy MD who plans on performing phaco with IOL OD  on September 21  This consultation is requested for the specific conditions prompting preoperative evaluation (i e  because of potential affect on operative risk): general health  Planned anesthesia: local  The patient has the following known anesthesia issues: none  Patients bleeding risk: no recent abnormal bleeding  Patient NA have objections to receiving blood products if needed  The following portions of the patient's history were reviewed and updated as appropriate:   She  has a past medical history of Colon polyp, Disease of thyroid gland, No known problems, Obesity (BMI 30-39 9), and Obesity (BMI 30 0-34 9)  She   Patient Active Problem List    Diagnosis Date Noted    Nuclear sclerotic cataract of right eye 09/10/2021    Pre-op examination 09/09/2021    Lump of right breast 10/27/2020    Obesity, Class II, BMI 35-39 9 10/20/2020    Fatty liver 10/20/2020    Dermatitis 05/01/2018    Hypothyroidism in adult 10/31/2017    Vitamin D deficiency 10/31/2017    Mixed hyperlipidemia 03/01/2007     She  has a past surgical history that includes Gallbladder surgery; Colonoscopy; and Cholecystectomy  Her family history includes Breast cancer in her sister; Cancer in her sister; Diabetes in her mother; Heart attack in her son; Heart disease in her father; No Known Problems in her brother, sister, and sister; Stroke in her mother; Thyroid disease in her other and sister  She  reports that she has never smoked  She has never used smokeless tobacco  She reports current alcohol use  She reports that she does not use drugs    Current Outpatient Medications   Medication Sig Dispense Refill    pimecrolimus (Elidel) 1 % cream Apply topically 2 (two) times a day 30 g 1    Synthroid 75 MCG tablet Take 1 tablet (75 mcg total) by mouth daily 90 tablet 3    Durezol 0 05 % EMUL INSTILL 1 DROP INTO RIGHT EYE FOUR TIMES A DAY AS DIRECTED FOR USE AFTER SURGERY IN OPERATIVE EYE  (Patient not taking: Reported on 9/9/2021)      ofloxacin (OCUFLOX) 0 3 % ophthalmic solution INSTILL 1 DROP INTO RIGHT EYE 4 TIMES A DAY AS DIRECTED  START 3 DAYS PRIOR TO SURGERY (Patient not taking: Reported on 9/9/2021)       No current facility-administered medications for this visit  Current Outpatient Medications on File Prior to Visit   Medication Sig    pimecrolimus (Elidel) 1 % cream Apply topically 2 (two) times a day    Synthroid 75 MCG tablet Take 1 tablet (75 mcg total) by mouth daily    Durezol 0 05 % EMUL INSTILL 1 DROP INTO RIGHT EYE FOUR TIMES A DAY AS DIRECTED FOR USE AFTER SURGERY IN OPERATIVE EYE  (Patient not taking: Reported on 9/9/2021)    ofloxacin (OCUFLOX) 0 3 % ophthalmic solution INSTILL 1 DROP INTO RIGHT EYE 4 TIMES A DAY AS DIRECTED  START 3 DAYS PRIOR TO SURGERY (Patient not taking: Reported on 9/9/2021)     No current facility-administered medications on file prior to visit  She is allergic to iodine - food allergy, levofloxacin, shellfish allergy - food allergy, and soy isoflavones       Review of Systems  Pertinent items are noted in HPI  Objective:     /74 (BP Location: Left arm, Patient Position: Sitting, Cuff Size: Large)   Pulse 72   Temp 98 4 °F (36 9 °C) (Tympanic)   Ht 5' 4 5" (1 638 m)   Wt 98 9 kg (218 lb)   SpO2 96%   BMI 36 84 kg/m²   General appearance: alert and oriented, in no acute distress  Head: Normocephalic, without obvious abnormality, atraumatic  Ears: normal TM's and external ear canals both ears  Nose: Nares normal  Septum midline  Mucosa normal  No drainage or sinus tenderness    Throat: lips, mucosa, and tongue normal; teeth and gums normal  Neck: no adenopathy, no carotid bruit, no JVD, supple, symmetrical, trachea midline and thyroid not enlarged, symmetric, no tenderness/mass/nodules  Back: symmetric, no curvature  ROM normal  No CVA tenderness  Lungs: clear to auscultation bilaterally  Heart: regular rate and rhythm, S1, S2 normal, no murmur, click, rub or gallop  Abdomen: soft, non-tender; bowel sounds normal; no masses,  no organomegaly  Skin: Skin color, texture, turgor normal  No rashes or lesions  Lymph nodes: Cervical adenopathy: not palpable  Neurologic: Grossly normal    Predictors of intubation difficulty:   Morbid obesity? no   Anatomically abnormal facies? no   Prominent incisors? no   Receding mandible? no   Short, thick neck? no   Neck range of motion: normal   Mallampati score: II (hard and soft palate, upper portion of tonsils anduvula visible)   Dentition: No chipped, loose, or missing teeth  Cardiographics  ECG: not done  Echocardiogram: not done    Imaging  Chest x-ray: not done     Lab Review   not applicable     Assessment:     77 y o  female with planned surgery as above  Known risk factors for perioperative complications: None    Difficulty with intubation is not anticipated  Cardiac Risk Estimation: Low risk, Pt is not on any anti coagulants  cleared for surgery    Current medications which may produce withdrawal symptoms if withheld perioperatively: none      Plan:     1  Preoperative workup as follows none  2  Change in medication regimen before surgery: none, continue medication regimen including morning of surgery, with sip of water  3  Prophylaxis for cardiac events with perioperative beta-blockers: not indicated  4  Invasive hemodynamic monitoring perioperatively: not indicated  5  Deep vein thrombosis prophylaxis postoperatively:regimen to be chosen by surgical team   6  Surveillance for postoperative MI with ECG immediately postoperatively and on postoperative days 1 and 2 AND troponin levels 24 hours postoperatively and on day 4 or hospital discharge (whichever comes first): not indicated    7  Other measures: none

## 2021-09-10 PROBLEM — H25.11 NUCLEAR SCLEROTIC CATARACT OF RIGHT EYE: Status: ACTIVE | Noted: 2021-09-10

## 2021-12-01 ENCOUNTER — TELEPHONE (OUTPATIENT)
Dept: FAMILY MEDICINE CLINIC | Facility: CLINIC | Age: 76
End: 2021-12-01

## 2022-02-10 ENCOUNTER — VBI (OUTPATIENT)
Dept: ADMINISTRATIVE | Facility: OTHER | Age: 77
End: 2022-02-10

## 2022-04-01 ENCOUNTER — RA CDI HCC (OUTPATIENT)
Dept: OTHER | Facility: HOSPITAL | Age: 77
End: 2022-04-01

## 2022-04-01 NOTE — PROGRESS NOTES
[address]  coding opportunities       Chart reviewed, no opportunity found: CHART REVIEWED, NO OPPORTUNITY FOUND        Patients Insurance     Medicare Insurance: Medicare

## 2022-04-08 ENCOUNTER — OFFICE VISIT (OUTPATIENT)
Dept: FAMILY MEDICINE CLINIC | Facility: CLINIC | Age: 77
End: 2022-04-08
Payer: MEDICARE

## 2022-04-08 VITALS
DIASTOLIC BLOOD PRESSURE: 70 MMHG | SYSTOLIC BLOOD PRESSURE: 122 MMHG | BODY MASS INDEX: 35.99 KG/M2 | HEIGHT: 65 IN | WEIGHT: 216 LBS | HEART RATE: 68 BPM | OXYGEN SATURATION: 98 % | TEMPERATURE: 97.6 F

## 2022-04-08 DIAGNOSIS — E03.9 HYPOTHYROIDISM IN ADULT: ICD-10-CM

## 2022-04-08 DIAGNOSIS — Z00.00 HEALTH CARE MAINTENANCE: Primary | ICD-10-CM

## 2022-04-08 DIAGNOSIS — M77.8 TENDINITIS OF LEFT SHOULDER: ICD-10-CM

## 2022-04-08 DIAGNOSIS — E28.39 MENOPAUSE OVARIAN FAILURE: ICD-10-CM

## 2022-04-08 DIAGNOSIS — R10.33 UMBILICAL PAIN: ICD-10-CM

## 2022-04-08 PROBLEM — H25.11 NUCLEAR SCLEROTIC CATARACT OF RIGHT EYE: Status: RESOLVED | Noted: 2021-09-10 | Resolved: 2022-04-08

## 2022-04-08 PROBLEM — Z01.818 PRE-OP EXAMINATION: Status: RESOLVED | Noted: 2021-09-09 | Resolved: 2022-04-08

## 2022-04-08 PROCEDURE — 99214 OFFICE O/P EST MOD 30 MIN: CPT | Performed by: FAMILY MEDICINE

## 2022-04-08 NOTE — PROGRESS NOTES
Assessment and Plan:     Problem List Items Addressed This Visit        Other    BMI 36 0-36 9,adult      Other Visit Diagnoses     Health care maintenance    -  Primary    Menopause ovarian failure        Relevant Orders    DXA bone density spine hip and pelvis        BMI Counseling: Body mass index is 36 5 kg/m²  The BMI is above normal  Nutrition recommendations include decreasing portion sizes and encouraging healthy choices of fruits and vegetables  Exercise recommendations include moderate physical activity 150 minutes/week  No pharmacotherapy was ordered  Rationale for BMI follow-up plan is due to patient being overweight or obese  Depression Screening and Follow-up Plan: Patient was screened for depression during today's encounter  They screened negative with a PHQ-2 score of 0  Preventive health issues were discussed with patient, and age appropriate screening tests were ordered as noted in patient's After Visit Summary  Personalized health advice and appropriate referrals for health education or preventive services given if needed, as noted in patient's After Visit Summary  History of Present Illness:     Patient presents for Medicare Annual Wellness visit    Patient Care Team:  Marvin Juarez DO as PCP - General  Metz Section, PA-C     Problem List:     Patient Active Problem List   Diagnosis    Hypothyroidism in adult    Mixed hyperlipidemia    Vitamin D deficiency    Dermatitis    BMI 36 0-36 9,adult    Fatty liver    Lump of right breast      Past Medical and Surgical History:     Past Medical History:   Diagnosis Date    Colon polyp     Disease of thyroid gland     No known problems     Obesity (BMI 30-39  9)     Obesity (BMI 30 0-34  9)      Past Surgical History:   Procedure Laterality Date    CHOLECYSTECTOMY      COLONOSCOPY      GALLBLADDER SURGERY        Family History:     Family History   Problem Relation Age of Onset    Stroke Mother         Cerebrovascular Accident    Diabetes Mother         Mellitus    Heart disease Father         Cardiac Disorder    Cancer Sister     Thyroid disease Sister     Breast cancer Sister         45s    Thyroid disease Other     No Known Problems Brother     Heart attack Son     No Known Problems Sister     No Known Problems Sister     Ovarian cancer Neg Hx     Cervical cancer Neg Hx     Uterine cancer Neg Hx       Social History:     Social History     Socioeconomic History    Marital status: /Civil Union     Spouse name: None    Number of children: None    Years of education: None    Highest education level: None   Occupational History    Occupation: Retired   Tobacco Use    Smoking status: Never Smoker    Smokeless tobacco: Never Used   Vaping Use    Vaping Use: Never used   Substance and Sexual Activity    Alcohol use: Yes     Comment: rarely     Drug use: No    Sexual activity: Not Currently   Other Topics Concern    None   Social History Narrative    Always uses seat belt    Does not use caffeine    Exercises Regularly    Live with spouse     Social Determinants of Health     Financial Resource Strain: Not on file   Food Insecurity: Not on file   Transportation Needs: Not on file   Physical Activity: Not on file   Stress: Not on file   Social Connections: Not on file   Intimate Partner Violence: Not on file   Housing Stability: Not on file      Medications and Allergies:     Current Outpatient Medications   Medication Sig Dispense Refill    pimecrolimus (Elidel) 1 % cream Apply topically 2 (two) times a day 30 g 1    Synthroid 75 MCG tablet Take 1 tablet (75 mcg total) by mouth daily 90 tablet 3     No current facility-administered medications for this visit       Allergies   Allergen Reactions    Iodine - Food Allergy Hives    Levofloxacin     Shellfish Allergy - Food Allergy Hives    Soy Isoflavones       Immunizations:     Immunization History   Administered Date(s) Administered    COVID-19 MODERNA VACC 0 5 ML IM 08/17/2021      Health Maintenance:         Topic Date Due    Hepatitis C Screening  Never done    Breast Cancer Screening: Mammogram  09/02/2021    Colorectal Cancer Screening  10/21/2029         Topic Date Due    Pneumococcal Vaccine: 65+ Years (1 of 1 - PPSV23) Never done    Influenza Vaccine (1) Never done    COVID-19 Vaccine (2 - Moderna 3-dose series) 09/14/2021      Medicare Health Risk Assessment:     /70 (BP Location: Left arm, Patient Position: Sitting, Cuff Size: Large)   Pulse 68   Temp 97 6 °F (36 4 °C)   Ht 5' 4 5" (1 638 m)   Wt 98 kg (216 lb)   SpO2 98%   BMI 36 50 kg/m²      Guilherme Herzog is here for her Subsequent Wellness visit  Last Medicare Wellness visit information reviewed, patient interviewed and updates made to the record today  Health Risk Assessment:   Patient rates overall health as very good  Patient feels that their physical health rating is slightly better  Patient is satisfied with their life  Eyesight was rated as same  Hearing was rated as same  Patient feels that their emotional and mental health rating is same  Patients states they are never, rarely angry  Patient states they are never, rarely unusually tired/fatigued  Pain experienced in the last 7 days has been none  Patient states that she has experienced no weight loss or gain in last 6 months  Depression Screening:   PHQ-2 Score: 0      Fall Risk Screening: In the past year, patient has experienced: no history of falling in past year      Urinary Incontinence Screening:   Patient has not leaked urine accidently in the last six months  Home Safety:  Patient does not have trouble with stairs inside or outside of their home  Patient has working smoke alarms and has working carbon monoxide detector  Home safety hazards include: none  Nutrition:   Current diet is Regular  Medications:   Patient is currently taking over-the-counter supplements   OTC medications include: see medication list  Patient is able to manage medications  Activities of Daily Living (ADLs)/Instrumental Activities of Daily Living (IADLs):   Walk and transfer into and out of bed and chair?: Yes  Dress and groom yourself?: Yes    Bathe or shower yourself?: Yes    Feed yourself? Yes  Do your laundry/housekeeping?: Yes  Manage your money, pay your bills and track your expenses?: Yes  Make your own meals?: Yes    Do your own shopping?: Yes    Previous Hospitalizations:   Any hospitalizations or ED visits within the last 12 months?: No      Advance Care Planning:   Living will: No    Durable POA for healthcare: No    Advanced directive: No    Advanced directive counseling given: Yes    Five wishes given: Yes      Cognitive Screening:   Provider or family/friend/caregiver concerned regarding cognition?: No    PREVENTIVE SCREENINGS      Cardiovascular Screening:    General: Screening Not Indicated and History Lipid Disorder      Colorectal Cancer Screening:     General: Screening Current      Breast Cancer Screening:     General: Screening Current      Cervical Cancer Screening:    General: Screening Not Indicated      Lung Cancer Screening:     General: Screening Not Indicated    Screening, Brief Intervention, and Referral to Treatment (SBIRT)    Screening  Typical number of drinks in a day: 0  Typical number of drinks in a week: 0  Interpretation: Low risk drinking behavior      AUDIT-C Screenin) How often did you have a drink containing alcohol in the past year? never  2) How many drinks did you have on a typical day when you were drinking in the past year? 0  3) How often did you have 6 or more drinks on one occasion in the past year? never    AUDIT-C Score: 0  Interpretation: Score 0-2 (female): Negative screen for alcohol misuse    Single Item Drug Screening:  How often have you used an illegal drug (including marijuana) or a prescription medication for non-medical reasons in the past year? never    Single Item Drug Screen Score: 0  Interpretation: Negative screen for possible drug use disorder    Other Counseling Topics:   Car/seat belt/driving safety         Reid Burdick DO

## 2022-04-08 NOTE — PATIENT INSTRUCTIONS
Medicare Preventive Visit Patient Instructions  Thank you for completing your Welcome to Medicare Visit or Medicare Annual Wellness Visit today  Your next wellness visit will be due in one year (4/9/2023)  The screening/preventive services that you may require over the next 5-10 years are detailed below  Some tests may not apply to you based off risk factors and/or age  Screening tests ordered at today's visit but not completed yet may show as past due  Also, please note that scanned in results may not display below  Preventive Screenings:  Service Recommendations Previous Testing/Comments   Colorectal Cancer Screening  * Colonoscopy    * Fecal Occult Blood Test (FOBT)/Fecal Immunochemical Test (FIT)  * Fecal DNA/Cologuard Test  * Flexible Sigmoidoscopy Age: 54-65 years old   Colonoscopy: every 10 years (may be performed more frequently if at higher risk)  OR  FOBT/FIT: every 1 year  OR  Cologuard: every 3 years  OR  Sigmoidoscopy: every 5 years  Screening may be recommended earlier than age 48 if at higher risk for colorectal cancer  Also, an individualized decision between you and your healthcare provider will decide whether screening between the ages of 74-80 would be appropriate  Colonoscopy: 10/21/2019  FOBT/FIT: Not on file  Cologuard: Not on file  Sigmoidoscopy: Not on file    Screening Current     Breast Cancer Screening Age: 36 years old  Frequency: every 1-2 years  Not required if history of left and right mastectomy Mammogram: 09/02/2020    Screening Current   Cervical Cancer Screening Between the ages of 21-29, pap smear recommended once every 3 years  Between the ages of 33-67, can perform pap smear with HPV co-testing every 5 years     Recommendations may differ for women with a history of total hysterectomy, cervical cancer, or abnormal pap smears in past  Pap Smear: Not on file    Screening Not Indicated   Hepatitis C Screening Once for adults born between 1945 and 1965  More frequently in patients at high risk for Hepatitis C Hep C Antibody: Not on file        Diabetes Screening 1-2 times per year if you're at risk for diabetes or have pre-diabetes Fasting glucose: 82 mg/dL   A1C: No results in last 5 years        Cholesterol Screening Once every 5 years if you don't have a lipid disorder  May order more often based on risk factors  Lipid panel: 10/29/2018    Screening Not Indicated  History Lipid Disorder     Other Preventive Screenings Covered by Medicare:  1  Abdominal Aortic Aneurysm (AAA) Screening: covered once if your at risk  You're considered to be at risk if you have a family history of AAA  2  Lung Cancer Screening: covers low dose CT scan once per year if you meet all of the following conditions: (1) Age 50-69; (2) No signs or symptoms of lung cancer; (3) Current smoker or have quit smoking within the last 15 years; (4) You have a tobacco smoking history of at least 30 pack years (packs per day multiplied by number of years you smoked); (5) You get a written order from a healthcare provider  3  Glaucoma Screening: covered annually if you're considered high risk: (1) You have diabetes OR (2) Family history of glaucoma OR (3)  aged 48 and older OR (3)  American aged 72 and older  3  Osteoporosis Screening: covered every 2 years if you meet one of the following conditions: (1) You're estrogen deficient and at risk for osteoporosis based off medical history and other findings; (2) Have a vertebral abnormality; (3) On glucocorticoid therapy for more than 3 months; (4) Have primary hyperparathyroidism; (5) On osteoporosis medications and need to assess response to drug therapy  · Last bone density test (DXA Scan): 04/24/2018  5  HIV Screening: covered annually if you're between the age of 12-76  Also covered annually if you are younger than 13 and older than 72 with risk factors for HIV infection   For pregnant patients, it is covered up to 3 times per pregnancy  Immunizations:  Immunization Recommendations   Influenza Vaccine Annual influenza vaccination during flu season is recommended for all persons aged >= 6 months who do not have contraindications   Pneumococcal Vaccine (Prevnar and Pneumovax)  * Prevnar = PCV13  * Pneumovax = PPSV23   Adults 25-60 years old: 1-3 doses may be recommended based on certain risk factors  Adults 72 years old: Prevnar (PCV13) vaccine recommended followed by Pneumovax (PPSV23) vaccine  If already received PPSV23 since turning 65, then PCV13 recommended at least one year after PPSV23 dose  Hepatitis B Vaccine 3 dose series if at intermediate or high risk (ex: diabetes, end stage renal disease, liver disease)   Tetanus (Td) Vaccine - COST NOT COVERED BY MEDICARE PART B Following completion of primary series, a booster dose should be given every 10 years to maintain immunity against tetanus  Td may also be given as tetanus wound prophylaxis  Tdap Vaccine - COST NOT COVERED BY MEDICARE PART B Recommended at least once for all adults  For pregnant patients, recommended with each pregnancy  Shingles Vaccine (Shingrix) - COST NOT COVERED BY MEDICARE PART B  2 shot series recommended in those aged 48 and above     Health Maintenance Due:      Topic Date Due    Hepatitis C Screening  Never done    Breast Cancer Screening: Mammogram  09/02/2021    Colorectal Cancer Screening  10/21/2029     Immunizations Due:      Topic Date Due    Pneumococcal Vaccine: 65+ Years (1 of 1 - PPSV23) Never done    Influenza Vaccine (1) Never done    COVID-19 Vaccine (2 - Moderna 3-dose series) 09/14/2021     Advance Directives   What are advance directives? Advance directives are legal documents that state your wishes and plans for medical care  These plans are made ahead of time in case you lose your ability to make decisions for yourself   Advance directives can apply to any medical decision, such as the treatments you want, and if you want to donate organs  What are the types of advance directives? There are many types of advance directives, and each state has rules about how to use them  You may choose a combination of any of the following:  · Living will: This is a written record of the treatment you want  You can also choose which treatments you do not want, which to limit, and which to stop at a certain time  This includes surgery, medicine, IV fluid, and tube feedings  · Durable power of  for healthcare [facility]): This is a written record that states who you want to make healthcare choices for you when you are unable to make them for yourself  This person, called a proxy, is usually a family member or a friend  You may choose more than 1 proxy  · Do not resuscitate (DNR) order:  A DNR order is used in case your heart stops beating or you stop breathing  It is a request not to have certain forms of treatment, such as CPR  A DNR order may be included in other types of advance directives  · Medical directive: This covers the care that you want if you are in a coma, near death, or unable to make decisions for yourself  You can list the treatments you want for each condition  Treatment may include pain medicine, surgery, blood transfusions, dialysis, IV or tube feedings, and a ventilator (breathing machine)  · Values history: This document has questions about your views, beliefs, and how you feel and think about life  This information can help others choose the care that you would choose  Why are advance directives important? An advance directive helps you control your care  Although spoken wishes may be used, it is better to have your wishes written down  Spoken wishes can be misunderstood, or not followed  Treatments may be given even if you do not want them  An advance directive may make it easier for your family to make difficult choices about your care     Weight Management   Why it is important to manage your weight:  Being overweight increases your risk of health conditions such as heart disease, high blood pressure, type 2 diabetes, and certain types of cancer  It can also increase your risk for osteoarthritis, sleep apnea, and other respiratory problems  Aim for a slow, steady weight loss  Even a small amount of weight loss can lower your risk of health problems  How to lose weight safely:  A safe and healthy way to lose weight is to eat fewer calories and get regular exercise  You can lose up about 1 pound a week by decreasing the number of calories you eat by 500 calories each day  Healthy meal plan for weight management:  A healthy meal plan includes a variety of foods, contains fewer calories, and helps you stay healthy  A healthy meal plan includes the following:  · Eat whole-grain foods more often  A healthy meal plan should contain fiber  Fiber is the part of grains, fruits, and vegetables that is not broken down by your body  Whole-grain foods are healthy and provide extra fiber in your diet  Some examples of whole-grain foods are whole-wheat breads and pastas, oatmeal, brown rice, and bulgur  · Eat a variety of vegetables every day  Include dark, leafy greens such as spinach, kale, jennifer greens, and mustard greens  Eat yellow and orange vegetables such as carrots, sweet potatoes, and winter squash  · Eat a variety of fruits every day  Choose fresh or canned fruit (canned in its own juice or light syrup) instead of juice  Fruit juice has very little or no fiber  · Eat low-fat dairy foods  Drink fat-free (skim) milk or 1% milk  Eat fat-free yogurt and low-fat cottage cheese  Try low-fat cheeses such as mozzarella and other reduced-fat cheeses  · Choose meat and other protein foods that are low in fat  Choose beans or other legumes such as split peas or lentils  Choose fish, skinless poultry (chicken or turkey), or lean cuts of red meat (beef or pork)  Before you cook meat or poultry, cut off any visible fat  · Use less fat and oil  Try baking foods instead of frying them  Add less fat, such as margarine, sour cream, regular salad dressing and mayonnaise to foods  Eat fewer high-fat foods  Some examples of high-fat foods include french fries, doughnuts, ice cream, and cakes  · Eat fewer sweets  Limit foods and drinks that are high in sugar  This includes candy, cookies, regular soda, and sweetened drinks  Exercise:  Exercise at least 30 minutes per day on most days of the week  Some examples of exercise include walking, biking, dancing, and swimming  You can also fit in more physical activity by taking the stairs instead of the elevator or parking farther away from stores  Ask your healthcare provider about the best exercise plan for you  © Copyright [employer] 2018 Information is for End User's use only and may not be sold, redistributed or otherwise used for commercial purposes   All illustrations and images included in CareNotes® are the copyrighted property of [employer]  or [address] [place] [address]

## 2022-04-08 NOTE — PROGRESS NOTES
Assessment/Plan:         Problem List Items Addressed This Visit        Endocrine    Hypothyroidism in adult     Continue levothyroxine, check TSH level         Relevant Orders    TSH, 3rd generation       Other    BMI 36 0-36 9,adult      Other Visit Diagnoses     Health care maintenance    -  Primary    Menopause ovarian failure        Relevant Orders    DXA bone density spine hip and pelvis    Umbilical pain        Relevant Orders    US abdomen complete    Tendinitis of left shoulder        Intermittent ice            Subjective:      Patient ID: Sanchez West is a 77 y o  female  Patient comes in today for checkup, she does have 2 complaints 1 is of some intermittent left shoulder pain that started after she was moving some heavy boxes  Second is of some periumbilical discomfort and bloating that she occasionally gets  The following portions of the patient's history were reviewed and updated as appropriate:   Past Medical History:  She has a past medical history of Colon polyp, Disease of thyroid gland, No known problems, Obesity (BMI 30-39 9), and Obesity (BMI 30 0-34 9)  ,  _______________________________________________________________________  Medical Problems:  does not have any pertinent problems on file ,  _______________________________________________________________________  Past Surgical History:   has a past surgical history that includes Gallbladder surgery; Colonoscopy; and Cholecystectomy  ,  _______________________________________________________________________  Family History:  family history includes Breast cancer in her sister; Cancer in her sister; Diabetes in her mother; Heart attack in her son; Heart disease in her father; No Known Problems in her brother, sister, and sister; Stroke in her mother; Thyroid disease in her other and sister  ,  _______________________________________________________________________  Social History:   reports that she has never smoked   She has never used smokeless tobacco  She reports current alcohol use  She reports that she does not use drugs  ,  _______________________________________________________________________  Allergies:  is allergic to iodine - food allergy, levofloxacin, shellfish allergy - food allergy, and soy isoflavones     _______________________________________________________________________  Current Outpatient Medications   Medication Sig Dispense Refill    pimecrolimus (Elidel) 1 % cream Apply topically 2 (two) times a day 30 g 1    Synthroid 75 MCG tablet Take 1 tablet (75 mcg total) by mouth daily 90 tablet 3     No current facility-administered medications for this visit      _______________________________________________________________________  Review of Systems   Constitutional: Negative for chills, fatigue and fever  HENT: Negative for congestion, ear pain, hearing loss, postnasal drip, rhinorrhea and sore throat  Eyes: Negative for pain and visual disturbance  Respiratory: Negative for chest tightness, shortness of breath and wheezing  Cardiovascular: Negative for chest pain and leg swelling  Gastrointestinal: Positive for abdominal pain  Negative for abdominal distention, constipation, diarrhea and vomiting  Endocrine: Negative for cold intolerance and heat intolerance  Genitourinary: Negative for difficulty urinating, frequency and urgency  Musculoskeletal: Positive for arthralgias (left shoulder)  Negative for gait problem  Skin: Negative for color change  Neurological: Negative for dizziness, tremors, syncope, numbness and headaches  Hematological: Negative for adenopathy  Psychiatric/Behavioral: Negative for agitation, confusion and sleep disturbance  The patient is not nervous/anxious            Objective:  Vitals:    04/08/22 1117   BP: 122/70   BP Location: Left arm   Patient Position: Sitting   Cuff Size: Large   Pulse: 68   Temp: 97 6 °F (36 4 °C)   SpO2: 98%   Weight: 98 kg (216 lb)   Height: 5' 4 5" (1 638 m)     Body mass index is 36 5 kg/m²  Physical Exam  Vitals and nursing note reviewed  Constitutional:       Appearance: She is well-developed  HENT:      Head: Normocephalic  Eyes:      General: No scleral icterus  Conjunctiva/sclera: Conjunctivae normal    Neck:      Thyroid: No thyromegaly  Cardiovascular:      Rate and Rhythm: Normal rate and regular rhythm  Heart sounds: Normal heart sounds  No murmur heard  Pulmonary:      Effort: Pulmonary effort is normal  No respiratory distress  Breath sounds: Normal breath sounds  No wheezing  Abdominal:      General: Bowel sounds are normal  There is no distension  Palpations: Abdomen is soft  There is no mass  Tenderness: There is abdominal tenderness ( mild periumbilical)  Hernia: No hernia is present  Musculoskeletal:         General: Tenderness (Left anterior shoulder) present  Normal range of motion  Cervical back: Normal range of motion  Lymphadenopathy:      Cervical: No cervical adenopathy  Skin:     General: Skin is warm and dry  Coloration: Skin is not pale  Findings: No rash  Neurological:      Mental Status: She is alert and oriented to person, place, and time  Cranial Nerves: No cranial nerve deficit     Psychiatric:         Behavior: Behavior normal

## 2022-04-11 ENCOUNTER — TELEPHONE (OUTPATIENT)
Dept: ADMINISTRATIVE | Facility: OTHER | Age: 77
End: 2022-04-11

## 2022-04-11 NOTE — TELEPHONE ENCOUNTER
Upon review of the In Basket request we were able to locate, review, and update the patient chart as requested for Mammogram     Any additional questions or concerns should be emailed to the Practice Liaisons via [email or web address] email, please do not reply via In Basket      Thank you  Michael Williamson

## 2022-04-28 LAB — TSH SERPL-ACNC: 2.65 MIU/L (ref 0.4–4.5)

## 2022-06-09 DIAGNOSIS — E03.9 HYPOTHYROIDISM IN ADULT: ICD-10-CM

## 2022-06-09 RX ORDER — LEVOTHYROXINE SODIUM 75 MCG
TABLET ORAL
Qty: 90 TABLET | Refills: 1 | Status: SHIPPED | OUTPATIENT
Start: 2022-06-09

## 2022-07-01 ENCOUNTER — HOSPITAL ENCOUNTER (OUTPATIENT)
Dept: ULTRASOUND IMAGING | Facility: HOSPITAL | Age: 77
Discharge: HOME | End: 2022-07-01
Attending: FAMILY MEDICINE
Payer: MEDICARE

## 2022-07-01 DIAGNOSIS — R10.33 UMBILICAL PAIN: ICD-10-CM

## 2022-07-01 PROCEDURE — 76700 US EXAM ABDOM COMPLETE: CPT

## 2022-09-09 ENCOUNTER — HOSPITAL ENCOUNTER (OUTPATIENT)
Dept: BONE DENSITY | Facility: CLINIC | Age: 77
Discharge: HOME | End: 2022-09-09
Payer: MEDICARE

## 2022-09-09 DIAGNOSIS — E28.39 MENOPAUSE OVARIAN FAILURE: ICD-10-CM

## 2022-09-09 PROCEDURE — 77080 DXA BONE DENSITY AXIAL: CPT

## 2022-10-10 DIAGNOSIS — Z12.31 SCREENING MAMMOGRAM, ENCOUNTER FOR: ICD-10-CM

## 2022-10-12 ENCOUNTER — OFFICE VISIT (OUTPATIENT)
Dept: FAMILY MEDICINE CLINIC | Facility: CLINIC | Age: 77
End: 2022-10-12
Payer: MEDICARE

## 2022-10-12 DIAGNOSIS — Z00.00 HEALTH CARE MAINTENANCE: Primary | ICD-10-CM

## 2022-10-12 DIAGNOSIS — E03.9 HYPOTHYROIDISM IN ADULT: ICD-10-CM

## 2022-10-12 DIAGNOSIS — E78.2 MIXED HYPERLIPIDEMIA: ICD-10-CM

## 2022-10-12 PROCEDURE — 99213 OFFICE O/P EST LOW 20 MIN: CPT | Performed by: FAMILY MEDICINE

## 2022-10-12 PROCEDURE — G0439 PPPS, SUBSEQ VISIT: HCPCS | Performed by: FAMILY MEDICINE

## 2022-10-12 NOTE — PATIENT INSTRUCTIONS
Medicare Preventive Visit Patient Instructions  Thank you for completing your Welcome to Medicare Visit or Medicare Annual Wellness Visit today  Your next wellness visit will be due in one year (10/13/2023)  The screening/preventive services that you may require over the next 5-10 years are detailed below  Some tests may not apply to you based off risk factors and/or age  Screening tests ordered at today's visit but not completed yet may show as past due  Also, please note that scanned in results may not display below  Preventive Screenings:  Service Recommendations Previous Testing/Comments   Colorectal Cancer Screening  * Colonoscopy    * Fecal Occult Blood Test (FOBT)/Fecal Immunochemical Test (FIT)  * Fecal DNA/Cologuard Test  * Flexible Sigmoidoscopy Age: 39-70 years old   Colonoscopy: every 10 years (may be performed more frequently if at higher risk)  OR  FOBT/FIT: every 1 year  OR  Cologuard: every 3 years  OR  Sigmoidoscopy: every 5 years  Screening may be recommended earlier than age 39 if at higher risk for colorectal cancer  Also, an individualized decision between you and your healthcare provider will decide whether screening between the ages of 74-80 would be appropriate  Colonoscopy: 10/21/2019  FOBT/FIT: Not on file  Cologuard: Not on file  Sigmoidoscopy: Not on file    Screening Current     Breast Cancer Screening Age: 36 years old  Frequency: every 1-2 years  Not required if history of left and right mastectomy Mammogram: 10/05/2022    Screening Current   Cervical Cancer Screening Between the ages of 21-29, pap smear recommended once every 3 years  Between the ages of 33-67, can perform pap smear with HPV co-testing every 5 years     Recommendations may differ for women with a history of total hysterectomy, cervical cancer, or abnormal pap smears in past  Pap Smear: Not on file    Screening Not Indicated   Hepatitis C Screening Once for adults born between 1945 and 1965  More frequently in patients at high risk for Hepatitis C Hep C Antibody: Not on file        Diabetes Screening 1-2 times per year if you're at risk for diabetes or have pre-diabetes Fasting glucose: 82 mg/dL (10/29/2018)  A1C: No results in last 5 years (No results in last 5 years)      Cholesterol Screening Once every 5 years if you don't have a lipid disorder  May order more often based on risk factors  Lipid panel: 10/29/2018    Screening Not Indicated  History Lipid Disorder     Other Preventive Screenings Covered by Medicare:  1  Abdominal Aortic Aneurysm (AAA) Screening: covered once if your at risk  You're considered to be at risk if you have a family history of AAA  2  Lung Cancer Screening: covers low dose CT scan once per year if you meet all of the following conditions: (1) Age 50-69; (2) No signs or symptoms of lung cancer; (3) Current smoker or have quit smoking within the last 15 years; (4) You have a tobacco smoking history of at least 20 pack years (packs per day multiplied by number of years you smoked); (5) You get a written order from a healthcare provider  3  Glaucoma Screening: covered annually if you're considered high risk: (1) You have diabetes OR (2) Family history of glaucoma OR (3)  aged 48 and older OR (3)  American aged 72 and older  3  Osteoporosis Screening: covered every 2 years if you meet one of the following conditions: (1) You're estrogen deficient and at risk for osteoporosis based off medical history and other findings; (2) Have a vertebral abnormality; (3) On glucocorticoid therapy for more than 3 months; (4) Have primary hyperparathyroidism; (5) On osteoporosis medications and need to assess response to drug therapy  · Last bone density test (DXA Scan): 09/09/2022   5  HIV Screening: covered annually if you're between the age of 15-65  Also covered annually if you are younger than 13 and older than 72 with risk factors for HIV infection   For pregnant patients, it is covered up to 3 times per pregnancy  Immunizations:  Immunization Recommendations   Influenza Vaccine Annual influenza vaccination during flu season is recommended for all persons aged >= 6 months who do not have contraindications   Pneumococcal Vaccine   * Pneumococcal conjugate vaccine = PCV13 (Prevnar 13), PCV15 (Vaxneuvance), PCV20 (Prevnar 20)  * Pneumococcal polysaccharide vaccine = PPSV23 (Pneumovax) Adults 25-60 years old: 1-3 doses may be recommended based on certain risk factors  Adults 72 years old: 1-2 doses may be recommended based off what pneumonia vaccine you previously received   Hepatitis B Vaccine 3 dose series if at intermediate or high risk (ex: diabetes, end stage renal disease, liver disease)   Tetanus (Td) Vaccine - COST NOT COVERED BY MEDICARE PART B Following completion of primary series, a booster dose should be given every 10 years to maintain immunity against tetanus  Td may also be given as tetanus wound prophylaxis  Tdap Vaccine - COST NOT COVERED BY MEDICARE PART B Recommended at least once for all adults  For pregnant patients, recommended with each pregnancy  Shingles Vaccine (Shingrix) - COST NOT COVERED BY MEDICARE PART B  2 shot series recommended in those aged 48 and above     Health Maintenance Due:      Topic Date Due   • Hepatitis C Screening  Never done   • Breast Cancer Screening: Mammogram  10/05/2023   • Colorectal Cancer Screening  10/21/2029     Immunizations Due:      Topic Date Due   • Pneumococcal Vaccine: 65+ Years (1 - PCV) Never done   • COVID-19 Vaccine (2 - Moderna series) 09/14/2021   • Influenza Vaccine (1) Never done     Advance Directives   What are advance directives? Advance directives are legal documents that state your wishes and plans for medical care  These plans are made ahead of time in case you lose your ability to make decisions for yourself   Advance directives can apply to any medical decision, such as the treatments you want, and if you want to donate organs  What are the types of advance directives? There are many types of advance directives, and each state has rules about how to use them  You may choose a combination of any of the following:  · Living will: This is a written record of the treatment you want  You can also choose which treatments you do not want, which to limit, and which to stop at a certain time  This includes surgery, medicine, IV fluid, and tube feedings  · Durable power of  for healthcare [facility]): This is a written record that states who you want to make healthcare choices for you when you are unable to make them for yourself  This person, called a proxy, is usually a family member or a friend  You may choose more than 1 proxy  · Do not resuscitate (DNR) order:  A DNR order is used in case your heart stops beating or you stop breathing  It is a request not to have certain forms of treatment, such as CPR  A DNR order may be included in other types of advance directives  · Medical directive: This covers the care that you want if you are in a coma, near death, or unable to make decisions for yourself  You can list the treatments you want for each condition  Treatment may include pain medicine, surgery, blood transfusions, dialysis, IV or tube feedings, and a ventilator (breathing machine)  · Values history: This document has questions about your views, beliefs, and how you feel and think about life  This information can help others choose the care that you would choose  Why are advance directives important? An advance directive helps you control your care  Although spoken wishes may be used, it is better to have your wishes written down  Spoken wishes can be misunderstood, or not followed  Treatments may be given even if you do not want them  An advance directive may make it easier for your family to make difficult choices about your care     Weight Management   Why it is important to manage your weight: Being overweight increases your risk of health conditions such as heart disease, high blood pressure, type 2 diabetes, and certain types of cancer  It can also increase your risk for osteoarthritis, sleep apnea, and other respiratory problems  Aim for a slow, steady weight loss  Even a small amount of weight loss can lower your risk of health problems  How to lose weight safely:  A safe and healthy way to lose weight is to eat fewer calories and get regular exercise  You can lose up about 1 pound a week by decreasing the number of calories you eat by 500 calories each day  Healthy meal plan for weight management:  A healthy meal plan includes a variety of foods, contains fewer calories, and helps you stay healthy  A healthy meal plan includes the following:  · Eat whole-grain foods more often  A healthy meal plan should contain fiber  Fiber is the part of grains, fruits, and vegetables that is not broken down by your body  Whole-grain foods are healthy and provide extra fiber in your diet  Some examples of whole-grain foods are whole-wheat breads and pastas, oatmeal, brown rice, and bulgur  · Eat a variety of vegetables every day  Include dark, leafy greens such as spinach, kale, jennifer greens, and mustard greens  Eat yellow and orange vegetables such as carrots, sweet potatoes, and winter squash  · Eat a variety of fruits every day  Choose fresh or canned fruit (canned in its own juice or light syrup) instead of juice  Fruit juice has very little or no fiber  · Eat low-fat dairy foods  Drink fat-free (skim) milk or 1% milk  Eat fat-free yogurt and low-fat cottage cheese  Try low-fat cheeses such as mozzarella and other reduced-fat cheeses  · Choose meat and other protein foods that are low in fat  Choose beans or other legumes such as split peas or lentils  Choose fish, skinless poultry (chicken or turkey), or lean cuts of red meat (beef or pork)   Before you cook meat or poultry, cut off any visible fat    · Use less fat and oil  Try baking foods instead of frying them  Add less fat, such as margarine, sour cream, regular salad dressing and mayonnaise to foods  Eat fewer high-fat foods  Some examples of high-fat foods include french fries, doughnuts, ice cream, and cakes  · Eat fewer sweets  Limit foods and drinks that are high in sugar  This includes candy, cookies, regular soda, and sweetened drinks  Exercise:  Exercise at least 30 minutes per day on most days of the week  Some examples of exercise include walking, biking, dancing, and swimming  You can also fit in more physical activity by taking the stairs instead of the elevator or parking farther away from stores  Ask your healthcare provider about the best exercise plan for you  © Copyright [employer] 2018 Information is for End User's use only and may not be sold, redistributed or otherwise used for commercial purposes   All illustrations and images included in CareNotes® are the copyrighted property of [employer]  or [address] [place] [address]

## 2022-10-12 NOTE — PROGRESS NOTES
Assessment and Plan:     Problem List Items Addressed This Visit        Endocrine    Hypothyroidism in adult     Check TSH, continue synthroid         Relevant Orders    TSH, 3rd generation       Other    Mixed hyperlipidemia     Check CMP Lipid profile         Relevant Orders    CBC    Comprehensive metabolic panel    Lipid panel    BMI 39 0-39 9,adult      Other Visit Diagnoses     Health care maintenance    -  Primary        BMI Counseling: Body mass index is 39 38 kg/m²  The BMI is above normal  Nutrition recommendations include decreasing portion sizes and encouraging healthy choices of fruits and vegetables  Exercise recommendations include moderate physical activity 150 minutes/week  No pharmacotherapy was ordered  Rationale for BMI follow-up plan is due to patient being overweight or obese  Depression Screening and Follow-up Plan: Patient was screened for depression during today's encounter  They screened negative with a PHQ-2 score of 0  Preventive health issues were discussed with patient, and age appropriate screening tests were ordered as noted in patient's After Visit Summary  Personalized health advice and appropriate referrals for health education or preventive services given if needed, as noted in patient's After Visit Summary  History of Present Illness:     Patient presents for a Medicare Wellness Visit    Patient presents for a check-up, no specific complaints     Patient Care Team:  Harpreet Sutherland DO as PCP - General  Fredy Garcia PA-C     Review of Systems:     Review of Systems   Constitutional: Negative for chills, fatigue and fever  HENT: Negative for congestion, ear pain, hearing loss, postnasal drip, rhinorrhea and sore throat  Eyes: Negative for pain and visual disturbance  Respiratory: Negative for chest tightness, shortness of breath and wheezing  Cardiovascular: Negative for chest pain and leg swelling     Gastrointestinal: Negative for abdominal distention, abdominal pain, constipation, diarrhea and vomiting  Endocrine: Negative for cold intolerance and heat intolerance  Genitourinary: Negative for difficulty urinating, frequency and urgency  Musculoskeletal: Negative for arthralgias and gait problem  Skin: Negative for color change  Neurological: Negative for dizziness, tremors, syncope, numbness and headaches  Hematological: Negative for adenopathy  Psychiatric/Behavioral: Negative for agitation, confusion and sleep disturbance  The patient is not nervous/anxious  Problem List:     Patient Active Problem List   Diagnosis   • Hypothyroidism in adult   • Mixed hyperlipidemia   • Vitamin D deficiency   • Dermatitis   • BMI 39 0-39 9,adult   • Fatty liver   • Lump of right breast      Past Medical and Surgical History:     Past Medical History:   Diagnosis Date   • Colon polyp    • Disease of thyroid gland    • No known problems    • Obesity (BMI 30-39  9)    • Obesity (BMI 30 0-34  9)      Past Surgical History:   Procedure Laterality Date   • CHOLECYSTECTOMY     • COLONOSCOPY     • GALLBLADDER SURGERY        Family History:     Family History   Problem Relation Age of Onset   • Stroke Mother         Cerebrovascular Accident   • Diabetes Mother         Mellitus   • Heart disease Father         Cardiac Disorder   • Cancer Sister    • Thyroid disease Sister    • Breast cancer Sister         45s   • Thyroid disease Other    • No Known Problems Brother    • Heart attack Son    • No Known Problems Sister    • No Known Problems Sister    • Ovarian cancer Neg Hx    • Cervical cancer Neg Hx    • Uterine cancer Neg Hx       Social History:     Social History     Socioeconomic History   • Marital status: /Civil Union     Spouse name: None   • Number of children: None   • Years of education: None   • Highest education level: None   Occupational History   • Occupation: Retired   Tobacco Use   • Smoking status: Never Smoker   • Smokeless tobacco: Never Used   Vaping Use   • Vaping Use: Never used   Substance and Sexual Activity   • Alcohol use: Yes     Comment: rarely    • Drug use: No   • Sexual activity: Not Currently   Other Topics Concern   • None   Social History Narrative    Always uses seat belt    Does not use caffeine    Exercises Regularly    Live with spouse     Social Determinants of Health     Financial Resource Strain: Low Risk    • Difficulty of Paying Living Expenses: Not very hard   Food Insecurity: Not on file   Transportation Needs: No Transportation Needs   • Lack of Transportation (Medical): No   • Lack of Transportation (Non-Medical): No   Physical Activity: Not on file   Stress: Not on file   Social Connections: Not on file   Intimate Partner Violence: Not on file   Housing Stability: Not on file      Medications and Allergies:     Current Outpatient Medications   Medication Sig Dispense Refill   • pimecrolimus (Elidel) 1 % cream Apply topically 2 (two) times a day 30 g 1   • Synthroid 75 MCG tablet TAKE 1 TABLET BY MOUTH EVERY DAY 90 tablet 1     No current facility-administered medications for this visit  Allergies   Allergen Reactions   • Iodine - Food Allergy Hives   • Levofloxacin    • Shellfish Allergy - Food Allergy Hives   • Soy Isoflavones       Immunizations:     Immunization History   Administered Date(s) Administered   • COVID-19 MODERNA VACC 0 5 ML IM 08/17/2021      Health Maintenance:         Topic Date Due   • Hepatitis C Screening  Never done   • Breast Cancer Screening: Mammogram  10/05/2023   • Colorectal Cancer Screening  10/21/2029         Topic Date Due   • Pneumococcal Vaccine: 65+ Years (1 - PCV) Never done   • COVID-19 Vaccine (2 - Leanna Kuster series) 09/14/2021   • Influenza Vaccine (1) Never done      Medicare Screening Tests and Risk Assessments:     Elouise Nyhan is here for her Subsequent Wellness visit  Last Medicare Wellness visit information reviewed, patient interviewed and updates made to the record today  Health Risk Assessment:   Patient rates overall health as good  Patient feels that their physical health rating is same  Patient is satisfied with their life  Eyesight was rated as same  Hearing was rated as same  Patient feels that their emotional and mental health rating is same  Patients states they are sometimes angry  Patient states they are never, rarely unusually tired/fatigued  Pain experienced in the last 7 days has been none  Patient states that she has experienced no weight loss or gain in last 6 months  Depression Screening:   PHQ-2 Score: 0      Fall Risk Screening: In the past year, patient has experienced: no history of falling in past year      Urinary Incontinence Screening:   Patient has not leaked urine accidently in the last six months  Home Safety:  Patient has trouble with stairs inside or outside of their home  Patient has working smoke alarms and has working carbon monoxide detector  Home safety hazards include: none  Nutrition:   Current diet is Regular  Medications:   Patient is currently taking over-the-counter supplements  OTC medications include: see medication list  Patient is able to manage medications  Activities of Daily Living (ADLs)/Instrumental Activities of Daily Living (IADLs):   Walk and transfer into and out of bed and chair?: Yes  Dress and groom yourself?: Yes    Bathe or shower yourself?: Yes    Feed yourself? Yes  Do your laundry/housekeeping?: Yes  Manage your money, pay your bills and track your expenses?: Yes  Make your own meals?: Yes    Do your own shopping?: Yes    Previous Hospitalizations:   Any hospitalizations or ED visits within the last 12 months?: No      Advance Care Planning:   Living will: Yes    Durable POA for healthcare:  Yes    Advanced directive: Yes      Cognitive Screening:   Provider or family/friend/caregiver concerned regarding cognition?: No    PREVENTIVE SCREENINGS      Cardiovascular Screening:    General: Screening Not Indicated and History Lipid Disorder    Due for: Lipid Panel      Diabetes Screening:       Due for: Blood Glucose      Colorectal Cancer Screening:     General: Screening Current      Breast Cancer Screening:     General: Screening Current      Cervical Cancer Screening:    General: Screening Not Indicated      Osteoporosis Screening:    General: Screening Current      Abdominal Aortic Aneurysm (AAA) Screening:        General: Screening Not Indicated      Lung Cancer Screening:     General: Screening Not Indicated      Hepatitis C Screening:    General: Screening Not Indicated    Screening, Brief Intervention, and Referral to Treatment (SBIRT)    Screening  Typical number of drinks in a day: 0  Typical number of drinks in a week: 0  Interpretation: Low risk drinking behavior  AUDIT-C Screenin) How often did you have a drink containing alcohol in the past year? never  2) How many drinks did you have on a typical day when you were drinking in the past year? 0  3) How often did you have 6 or more drinks on one occasion in the past year? never    AUDIT-C Score: 0  Interpretation: Score 0-2 (female): Negative screen for alcohol misuse    Single Item Drug Screening:  How often have you used an illegal drug (including marijuana) or a prescription medication for non-medical reasons in the past year? never    Single Item Drug Screen Score: 0  Interpretation: Negative screen for possible drug use disorder    Other Counseling Topics:   Car/seat belt/driving safety       No exam data present     Physical Exam:     /72 (BP Location: Left arm, Patient Position: Sitting, Cuff Size: Large)   Pulse 84   Temp 98 1 °F (36 7 °C)   Ht 5' 4 5" (1 638 m)   Wt 106 kg (233 lb)   SpO2 98%   BMI 39 38 kg/m²     Physical Exam     Harpreetjordan Sutherland, DO

## 2022-10-13 VITALS
OXYGEN SATURATION: 98 % | HEART RATE: 84 BPM | TEMPERATURE: 98.1 F | HEIGHT: 65 IN | BODY MASS INDEX: 37.15 KG/M2 | DIASTOLIC BLOOD PRESSURE: 72 MMHG | SYSTOLIC BLOOD PRESSURE: 126 MMHG | WEIGHT: 223 LBS

## 2022-11-03 LAB
ALBUMIN SERPL-MCNC: 4 G/DL (ref 3.6–5.1)
ALBUMIN/GLOB SERPL: 1.3 (CALC) (ref 1–2.5)
ALP SERPL-CCNC: 81 U/L (ref 37–153)
ALT SERPL-CCNC: 18 U/L (ref 6–29)
AST SERPL-CCNC: 17 U/L (ref 10–35)
BILIRUB SERPL-MCNC: 0.6 MG/DL (ref 0.2–1.2)
BUN SERPL-MCNC: 14 MG/DL (ref 7–25)
BUN/CREAT SERPL: NORMAL (CALC) (ref 6–22)
CALCIUM SERPL-MCNC: 9.3 MG/DL (ref 8.6–10.4)
CHLORIDE SERPL-SCNC: 106 MMOL/L (ref 98–110)
CHOLEST SERPL-MCNC: 206 MG/DL
CHOLEST/HDLC SERPL: 4.3 (CALC)
CO2 SERPL-SCNC: 24 MMOL/L (ref 20–32)
CREAT SERPL-MCNC: 0.76 MG/DL (ref 0.6–1)
ERYTHROCYTE [DISTWIDTH] IN BLOOD BY AUTOMATED COUNT: 13.9 % (ref 11–15)
GFR/BSA.PRED SERPLBLD CYS-BASED-ARV: 82 ML/MIN/1.73M2
GLOBULIN SER CALC-MCNC: 3.2 G/DL (CALC) (ref 1.9–3.7)
GLUCOSE SERPL-MCNC: 87 MG/DL (ref 65–99)
HCT VFR BLD AUTO: 41 % (ref 35–45)
HDLC SERPL-MCNC: 48 MG/DL
HGB BLD-MCNC: 13 G/DL (ref 11.7–15.5)
LDLC SERPL CALC-MCNC: 136 MG/DL (CALC)
MCH RBC QN AUTO: 27.5 PG (ref 27–33)
MCHC RBC AUTO-ENTMCNC: 31.7 G/DL (ref 32–36)
MCV RBC AUTO: 86.7 FL (ref 80–100)
NONHDLC SERPL-MCNC: 158 MG/DL (CALC)
PLATELET # BLD AUTO: 270 THOUSAND/UL (ref 140–400)
PMV BLD REES-ECKER: 9.5 FL (ref 7.5–12.5)
POTASSIUM SERPL-SCNC: 4.2 MMOL/L (ref 3.5–5.3)
PROT SERPL-MCNC: 7.2 G/DL (ref 6.1–8.1)
RBC # BLD AUTO: 4.73 MILLION/UL (ref 3.8–5.1)
SODIUM SERPL-SCNC: 139 MMOL/L (ref 135–146)
TRIGL SERPL-MCNC: 116 MG/DL
TSH SERPL-ACNC: 4.84 MIU/L (ref 0.4–4.5)
WBC # BLD AUTO: 6.8 THOUSAND/UL (ref 3.8–10.8)

## 2022-11-04 ENCOUNTER — TELEPHONE (OUTPATIENT)
Dept: FAMILY MEDICINE CLINIC | Facility: CLINIC | Age: 77
End: 2022-11-04

## 2022-11-04 NOTE — TELEPHONE ENCOUNTER
T/c from pt asking for Dr Chel Soni to review recent blood work and let her know what her new dose of synthroid will be

## 2022-11-07 DIAGNOSIS — E03.9 HYPOTHYROIDISM IN ADULT: ICD-10-CM

## 2022-11-07 RX ORDER — LEVOTHYROXINE SODIUM 100 MCG
100 TABLET ORAL DAILY
Qty: 90 TABLET | Refills: 0 | Status: SHIPPED | OUTPATIENT
Start: 2022-11-07

## 2022-12-27 ENCOUNTER — TELEPHONE (OUTPATIENT)
Dept: FAMILY MEDICINE CLINIC | Facility: CLINIC | Age: 77
End: 2022-12-27

## 2023-01-20 DIAGNOSIS — E03.9 HYPOTHYROIDISM IN ADULT: Primary | ICD-10-CM

## 2023-01-20 RX ORDER — LEVOTHYROXINE SODIUM 88 MCG
88 TABLET ORAL DAILY
Qty: 30 TABLET | Refills: 3 | Status: SHIPPED | OUTPATIENT
Start: 2023-01-20

## 2023-05-19 DIAGNOSIS — E03.9 HYPOTHYROIDISM IN ADULT: ICD-10-CM

## 2023-05-19 RX ORDER — LEVOTHYROXINE SODIUM 88 MCG
TABLET ORAL
Qty: 30 TABLET | Refills: 3 | Status: SHIPPED | OUTPATIENT
Start: 2023-05-19

## 2023-06-09 ENCOUNTER — TELEPHONE (OUTPATIENT)
Dept: FAMILY MEDICINE CLINIC | Facility: CLINIC | Age: 78
End: 2023-06-09

## 2023-06-09 DIAGNOSIS — E03.9 HYPOTHYROIDISM IN ADULT: ICD-10-CM

## 2023-06-09 DIAGNOSIS — E78.2 MIXED HYPERLIPIDEMIA: Primary | ICD-10-CM

## 2023-06-09 NOTE — TELEPHONE ENCOUNTER
T/c from pt -- is requesting Dr Lucero Noble enter blood work orders to check her thyroid and blood sugar  She has been having increased hair loss and is concerned that her medication is either too much, or not enough  Please advise

## 2023-06-10 LAB
ALBUMIN SERPL-MCNC: 4 G/DL (ref 3.6–5.1)
ALBUMIN/GLOB SERPL: 1.4 (CALC) (ref 1–2.5)
ALP SERPL-CCNC: 90 U/L (ref 37–153)
ALT SERPL-CCNC: 15 U/L (ref 6–29)
AST SERPL-CCNC: 16 U/L (ref 10–35)
BASOPHILS # BLD AUTO: 63 CELLS/UL (ref 0–200)
BASOPHILS NFR BLD AUTO: 1 %
BILIRUB SERPL-MCNC: 0.7 MG/DL (ref 0.2–1.2)
BUN SERPL-MCNC: 10 MG/DL (ref 7–25)
BUN/CREAT SERPL: NORMAL (CALC) (ref 6–22)
CALCIUM SERPL-MCNC: 9.1 MG/DL (ref 8.6–10.4)
CHLORIDE SERPL-SCNC: 106 MMOL/L (ref 98–110)
CO2 SERPL-SCNC: 27 MMOL/L (ref 20–32)
CREAT SERPL-MCNC: 0.78 MG/DL (ref 0.6–1)
EOSINOPHIL # BLD AUTO: 202 CELLS/UL (ref 15–500)
EOSINOPHIL NFR BLD AUTO: 3.2 %
ERYTHROCYTE [DISTWIDTH] IN BLOOD BY AUTOMATED COUNT: 14.3 % (ref 11–15)
GFR/BSA.PRED SERPLBLD CYS-BASED-ARV: 79 ML/MIN/1.73M2
GLOBULIN SER CALC-MCNC: 2.9 G/DL (CALC) (ref 1.9–3.7)
GLUCOSE SERPL-MCNC: 85 MG/DL (ref 65–99)
HCT VFR BLD AUTO: 43.1 % (ref 35–45)
HGB BLD-MCNC: 13.5 G/DL (ref 11.7–15.5)
LYMPHOCYTES # BLD AUTO: 2489 CELLS/UL (ref 850–3900)
LYMPHOCYTES NFR BLD AUTO: 39.5 %
MCH RBC QN AUTO: 27 PG (ref 27–33)
MCHC RBC AUTO-ENTMCNC: 31.3 G/DL (ref 32–36)
MCV RBC AUTO: 86.2 FL (ref 80–100)
MONOCYTES # BLD AUTO: 498 CELLS/UL (ref 200–950)
MONOCYTES NFR BLD AUTO: 7.9 %
NEUTROPHILS # BLD AUTO: 3049 CELLS/UL (ref 1500–7800)
NEUTROPHILS NFR BLD AUTO: 48.4 %
PLATELET # BLD AUTO: 268 THOUSAND/UL (ref 140–400)
PMV BLD REES-ECKER: 9.9 FL (ref 7.5–12.5)
POTASSIUM SERPL-SCNC: 4 MMOL/L (ref 3.5–5.3)
PROT SERPL-MCNC: 6.9 G/DL (ref 6.1–8.1)
RBC # BLD AUTO: 5 MILLION/UL (ref 3.8–5.1)
SODIUM SERPL-SCNC: 140 MMOL/L (ref 135–146)
TSH SERPL-ACNC: 0.37 MIU/L (ref 0.4–4.5)
WBC # BLD AUTO: 6.3 THOUSAND/UL (ref 3.8–10.8)

## 2023-08-16 DIAGNOSIS — E03.9 HYPOTHYROIDISM IN ADULT: ICD-10-CM

## 2023-08-16 RX ORDER — LEVOTHYROXINE SODIUM 88 MCG
TABLET ORAL
Qty: 90 TABLET | Refills: 1 | Status: SHIPPED | OUTPATIENT
Start: 2023-08-16

## 2023-09-29 ENCOUNTER — OFFICE VISIT (OUTPATIENT)
Dept: FAMILY MEDICINE CLINIC | Facility: CLINIC | Age: 78
End: 2023-09-29
Payer: MEDICARE

## 2023-09-29 VITALS
BODY MASS INDEX: 35.65 KG/M2 | WEIGHT: 214 LBS | TEMPERATURE: 97.9 F | HEART RATE: 75 BPM | HEIGHT: 65 IN | OXYGEN SATURATION: 96 % | DIASTOLIC BLOOD PRESSURE: 78 MMHG | SYSTOLIC BLOOD PRESSURE: 124 MMHG

## 2023-09-29 DIAGNOSIS — R42 VERTIGO: ICD-10-CM

## 2023-09-29 DIAGNOSIS — Z12.31 ENCOUNTER FOR SCREENING MAMMOGRAM FOR MALIGNANT NEOPLASM OF BREAST: Primary | ICD-10-CM

## 2023-09-29 PROCEDURE — 99213 OFFICE O/P EST LOW 20 MIN: CPT | Performed by: FAMILY MEDICINE

## 2023-09-29 RX ORDER — PHENTERMINE HYDROCHLORIDE 37.5 MG/1
37.5 TABLET ORAL DAILY
Qty: 30 TABLET | Refills: 0 | Status: SHIPPED | OUTPATIENT
Start: 2023-09-29

## 2023-09-29 RX ORDER — HYDROXYZINE PAMOATE 25 MG/1
25 CAPSULE ORAL 3 TIMES DAILY PRN
Qty: 30 CAPSULE | Refills: 0 | Status: SHIPPED | OUTPATIENT
Start: 2023-09-29

## 2023-09-29 NOTE — PROGRESS NOTES
Name: Caity Adams      : [id number]      MRN: [id number]  Encounter Provider: Alaina Worthington DO  Encounter Date: 2023   Encounter department: [address] [place] [address] [address] [facility]     1. Encounter for screening mammogram for malignant neoplasm of breast  -     Mammo screening bilateral w 3d & cad; Future; Expected date: 2023    2. Vertigo  -     hydrOXYzine pamoate (VISTARIL) 25 mg capsule; Take 1 capsule (25 mg total) by mouth 3 (three) times a day as needed (congestion)    3. BMI 36.0-36.9,adult  -     phentermine (ADIPEX-P) 37.5 MG tablet; Take 1 tablet (37.5 mg total) by mouth in the morning           Subjective      Patient comes in today with dizziness is worse in the morning. She states is worse when she sleeps on her left side. She states this started after she was seated for a long position and having a dental procedure. He does complain of some pressure in her left ear. She would like to start something to help her lose weight. Review of Systems   Constitutional: Negative for chills, fatigue and fever. HENT: Negative for congestion, ear pain, hearing loss, postnasal drip, rhinorrhea and sore throat. Eyes: Negative for pain and visual disturbance. Respiratory: Negative for chest tightness, shortness of breath and wheezing. Cardiovascular: Negative for chest pain and leg swelling. Gastrointestinal: Negative for abdominal distention, abdominal pain, constipation, diarrhea and vomiting. Endocrine: Negative for cold intolerance and heat intolerance. Genitourinary: Negative for difficulty urinating, frequency and urgency. Musculoskeletal: Negative for arthralgias and gait problem. Skin: Negative for color change. Neurological: Positive for dizziness. Negative for tremors, syncope, numbness and headaches. Hematological: Negative for adenopathy. Psychiatric/Behavioral: Negative for agitation, confusion and sleep disturbance. The patient is not nervous/anxious. Current Outpatient Medications on File Prior to Visit   Medication Sig   • Synthroid 88 MCG tablet TAKE 1 TABLET BY MOUTH EVERY DAY   • pimecrolimus (Elidel) 1 % cream Apply topically 2 (two) times a day       Objective     /78   Pulse 75   Temp 97.9 °F (36.6 °C)   Ht 5' 4.5" (1.638 m)   Wt 97.1 kg (214 lb)   SpO2 96%   BMI 36.17 kg/m²     Physical Exam  Vitals and nursing note reviewed. Constitutional:       Appearance: She is well-developed. HENT:      Head: Normocephalic. Ears:      Comments: Left serous effusion  Eyes:      General: No scleral icterus. Conjunctiva/sclera: Conjunctivae normal.   Neck:      Thyroid: No thyromegaly. Cardiovascular:      Rate and Rhythm: Normal rate and regular rhythm. Heart sounds: Normal heart sounds. No murmur heard. Pulmonary:      Effort: Pulmonary effort is normal. No respiratory distress. Breath sounds: Normal breath sounds. No wheezing. Abdominal:      General: Bowel sounds are normal. There is no distension. Palpations: Abdomen is soft. Tenderness: There is no abdominal tenderness. Musculoskeletal:         General: No tenderness. Normal range of motion. Cervical back: Normal range of motion. Lymphadenopathy:      Cervical: No cervical adenopathy. Skin:     General: Skin is warm and dry. Coloration: Skin is not pale. Findings: No rash. Neurological:      Mental Status: She is alert and oriented to person, place, and time. Cranial Nerves: No cranial nerve deficit.    Psychiatric:         Behavior: Behavior normal.       Danielle Faith DO

## 2023-10-16 ENCOUNTER — RA CDI HCC (OUTPATIENT)
Dept: OTHER | Facility: HOSPITAL | Age: 78
End: 2023-10-16

## 2023-10-16 NOTE — PROGRESS NOTES
[address] [facility] coding opportunities       Chart reviewed, no opportunity found: CHART REVIEWED, NO OPPORTUNITY FOUND        Patients Insurance     Medicare Insurance: Medicare

## 2023-10-18 ENCOUNTER — OFFICE VISIT (OUTPATIENT)
Dept: FAMILY MEDICINE CLINIC | Facility: CLINIC | Age: 78
End: 2023-10-18
Payer: MEDICARE

## 2023-10-18 VITALS
BODY MASS INDEX: 35.82 KG/M2 | HEIGHT: 65 IN | TEMPERATURE: 96 F | DIASTOLIC BLOOD PRESSURE: 80 MMHG | OXYGEN SATURATION: 98 % | HEART RATE: 73 BPM | SYSTOLIC BLOOD PRESSURE: 122 MMHG | WEIGHT: 215 LBS

## 2023-10-18 DIAGNOSIS — E03.9 HYPOTHYROIDISM IN ADULT: ICD-10-CM

## 2023-10-18 DIAGNOSIS — Z00.00 HEALTH CARE MAINTENANCE: Primary | ICD-10-CM

## 2023-10-18 DIAGNOSIS — E78.2 MIXED HYPERLIPIDEMIA: ICD-10-CM

## 2023-10-18 DIAGNOSIS — E55.9 VITAMIN D DEFICIENCY: ICD-10-CM

## 2023-10-18 PROCEDURE — 99214 OFFICE O/P EST MOD 30 MIN: CPT | Performed by: FAMILY MEDICINE

## 2023-10-18 PROCEDURE — G0439 PPPS, SUBSEQ VISIT: HCPCS | Performed by: FAMILY MEDICINE

## 2023-10-18 NOTE — PATIENT INSTRUCTIONS
Medicare Preventive Visit Patient Instructions  Thank you for completing your Welcome to Medicare Visit or Medicare Annual Wellness Visit today. Your next wellness visit will be due in one year (10/18/2024). The screening/preventive services that you may require over the next 5-10 years are detailed below. Some tests may not apply to you based off risk factors and/or age. Screening tests ordered at today's visit but not completed yet may show as past due. Also, please note that scanned in results may not display below. Preventive Screenings:  Service Recommendations Previous Testing/Comments   Colorectal Cancer Screening  * Colonoscopy    * Fecal Occult Blood Test (FOBT)/Fecal Immunochemical Test (FIT)  * Fecal DNA/Cologuard Test  * Flexible Sigmoidoscopy Age: 43-73 years old   Colonoscopy: every 10 years (may be performed more frequently if at higher risk)  OR  FOBT/FIT: every 1 year  OR  Cologuard: every 3 years  OR  Sigmoidoscopy: every 5 years  Screening may be recommended earlier than age 39 if at higher risk for colorectal cancer. Also, an individualized decision between you and your healthcare provider will decide whether screening between the ages of 77-80 would be appropriate. Colonoscopy: 10/21/2019  FOBT/FIT: Not on file  Cologuard: Not on file  Sigmoidoscopy: Not on file          Breast Cancer Screening Age: 36 years old  Frequency: every 1-2 years  Not required if history of left and right mastectomy Mammogram: 10/05/2022    Screening Current   Cervical Cancer Screening Between the ages of 21-29, pap smear recommended once every 3 years. Between the ages of 32-69, can perform pap smear with HPV co-testing every 5 years.    Recommendations may differ for women with a history of total hysterectomy, cervical cancer, or abnormal pap smears in past. Pap Smear: Not on file    Screening Not Indicated   Hepatitis C Screening Once for adults born between 1945 and 1965  More frequently in patients at high risk for Hepatitis C Hep C Antibody: Not on file        Diabetes Screening 1-2 times per year if you're at risk for diabetes or have pre-diabetes Fasting glucose: 82 mg/dL (10/29/2018)  A1C: No results in last 5 years (No results in last 5 years)  Screening Current   Cholesterol Screening Once every 5 years if you don't have a lipid disorder. May order more often based on risk factors. Lipid panel: 11/02/2022    Screening Not Indicated  History Lipid Disorder     Other Preventive Screenings Covered by Medicare:  Abdominal Aortic Aneurysm (AAA) Screening: covered once if your at risk. You're considered to be at risk if you have a family history of AAA. Lung Cancer Screening: covers low dose CT scan once per year if you meet all of the following conditions: (1) Age 48-67; (2) No signs or symptoms of lung cancer; (3) Current smoker or have quit smoking within the last 15 years; (4) You have a tobacco smoking history of at least 20 pack years (packs per day multiplied by number of years you smoked); (5) You get a written order from a healthcare provider. Glaucoma Screening: covered annually if you're considered high risk: (1) You have diabetes OR (2) Family history of glaucoma OR (3)  aged 48 and older OR (3)  American aged 72 and older  Osteoporosis Screening: covered every 2 years if you meet one of the following conditions: (1) You're estrogen deficient and at risk for osteoporosis based off medical history and other findings; (2) Have a vertebral abnormality; (3) On glucocorticoid therapy for more than 3 months; (4) Have primary hyperparathyroidism; (5) On osteoporosis medications and need to assess response to drug therapy. Last bone density test (DXA Scan): 09/09/2022. HIV Screening: covered annually if you're between the age of 14-79. Also covered annually if you are younger than 13 and older than 72 with risk factors for HIV infection.  For pregnant patients, it is covered up to 3 times per pregnancy. Immunizations:  Immunization Recommendations   Influenza Vaccine Annual influenza vaccination during flu season is recommended for all persons aged >= 6 months who do not have contraindications   Pneumococcal Vaccine   * Pneumococcal conjugate vaccine = PCV13 (Prevnar 13), PCV15 (Vaxneuvance), PCV20 (Prevnar 20)  * Pneumococcal polysaccharide vaccine = PPSV23 (Pneumovax) Adults 25-76 yo with certain risk factors or if 69+ yo  If never received any pneumonia vaccine: recommend Prevnar 20 (PCV20)  Give PCV20 if previously received 1 dose of PCV13 or PPSV23   Hepatitis B Vaccine 3 dose series if at intermediate or high risk (ex: diabetes, end stage renal disease, liver disease)   Respiratory syncytial virus (RSV) Vaccine - COVERED BY MEDICARE PART D  * RSVPreF3 (Arexvy) CDC recommends that adults 61years of age and older may receive a single dose of RSV vaccine using shared clinical decision-making (SCDM)   Tetanus (Td) Vaccine - COST NOT COVERED BY MEDICARE PART B Following completion of primary series, a booster dose should be given every 10 years to maintain immunity against tetanus. Td may also be given as tetanus wound prophylaxis. Tdap Vaccine - COST NOT COVERED BY MEDICARE PART B Recommended at least once for all adults. For pregnant patients, recommended with each pregnancy. Shingles Vaccine (Shingrix) - COST NOT COVERED BY MEDICARE PART B  2 shot series recommended in those 19 years and older who have or will have weakened immune systems or those 50 years and older     Health Maintenance Due:      Topic Date Due   • Hepatitis C Screening  Never done   • Breast Cancer Screening: Mammogram  10/05/2023   • Colorectal Cancer Screening  Discontinued     Immunizations Due:      Topic Date Due   • Pneumococcal Vaccine: 65+ Years (1 - PCV) Never done   • Influenza Vaccine (1) Never done     Advance Directives   What are advance directives?   Advance directives are legal documents that state your wishes and plans for medical care. These plans are made ahead of time in case you lose your ability to make decisions for yourself. Advance directives can apply to any medical decision, such as the treatments you want, and if you want to donate organs. What are the types of advance directives? There are many types of advance directives, and each state has rules about how to use them. You may choose a combination of any of the following:  Living will: This is a written record of the treatment you want. You can also choose which treatments you do not want, which to limit, and which to stop at a certain time. This includes surgery, medicine, IV fluid, and tube feedings. Durable power of  for [facility]): This is a written record that states who you want to make healthcare choices for you when you are unable to make them for yourself. This person, called a proxy, is usually a family member or a friend. You may choose more than 1 proxy. Do not resuscitate (DNR) order:  A DNR order is used in case your heart stops beating or you stop breathing. It is a request not to have certain forms of treatment, such as CPR. A DNR order may be included in other types of advance directives. Medical directive: This covers the care that you want if you are in a coma, near death, or unable to make decisions for yourself. You can list the treatments you want for each condition. Treatment may include pain medicine, surgery, blood transfusions, dialysis, IV or tube feedings, and a ventilator (breathing machine). Values history: This document has questions about your views, beliefs, and how you feel and think about life. This information can help others choose the care that you would choose. Why are advance directives important? An advance directive helps you control your care. Although spoken wishes may be used, it is better to have your wishes written down.  Spoken wishes can be misunderstood, or not followed. Treatments may be given even if you do not want them. An advance directive may make it easier for your family to make difficult choices about your care. Weight Management   Why it is important to manage your weight:  Being overweight increases your risk of health conditions such as heart disease, high blood pressure, type 2 diabetes, and certain types of cancer. It can also increase your risk for osteoarthritis, sleep apnea, and other respiratory problems. Aim for a slow, steady weight loss. Even a small amount of weight loss can lower your risk of health problems. How to lose weight safely:  A safe and healthy way to lose weight is to eat fewer calories and get regular exercise. You can lose up about 1 pound a week by decreasing the number of calories you eat by 500 calories each day. Healthy meal plan for weight management:  A healthy meal plan includes a variety of foods, contains fewer calories, and helps you stay healthy. A healthy meal plan includes the following:  Eat whole-grain foods more often. A healthy meal plan should contain fiber. Fiber is the part of grains, fruits, and vegetables that is not broken down by your body. Whole-grain foods are healthy and provide extra fiber in your diet. Some examples of whole-grain foods are whole-wheat breads and pastas, oatmeal, brown rice, and bulgur. Eat a variety of vegetables every day. Include dark, leafy greens such as spinach, kale, jennifer greens, and mustard greens. Eat yellow and orange vegetables such as carrots, sweet potatoes, and winter squash. Eat a variety of fruits every day. Choose fresh or canned fruit (canned in its own juice or light syrup) instead of juice. Fruit juice has very little or no fiber. Eat low-fat dairy foods. Drink fat-free (skim) milk or 1% milk. Eat fat-free yogurt and low-fat cottage cheese. Try low-fat cheeses such as mozzarella and other reduced-fat cheeses.   Choose meat and other protein foods that are low in fat. Choose beans or other legumes such as split peas or lentils. Choose fish, skinless poultry (chicken or turkey), or lean cuts of red meat (beef or pork). Before you cook meat or poultry, cut off any visible fat. Use less fat and oil. Try baking foods instead of frying them. Add less fat, such as margarine, sour cream, regular salad dressing and mayonnaise to foods. Eat fewer high-fat foods. Some examples of high-fat foods include french fries, doughnuts, ice cream, and cakes. Eat fewer sweets. Limit foods and drinks that are high in sugar. This includes candy, cookies, regular soda, and sweetened drinks. Exercise:  Exercise at least 30 minutes per day on most days of the week. Some examples of exercise include walking, biking, dancing, and swimming. You can also fit in more physical activity by taking the stairs instead of the elevator or parking farther away from stores. Ask your healthcare provider about the best exercise plan for you. © Copyright [employer] 2018 Information is for End User's use only and may not be sold, redistributed or otherwise used for commercial purposes.  All illustrations and images included in CareNotes® are the copyrighted property of [employer] or  [employer]

## 2023-10-18 NOTE — PROGRESS NOTES
Assessment and Plan:     Problem List Items Addressed This Visit        Endocrine    Hypothyroidism in adult     Stable, continue her Synthroid         Relevant Orders    TSH, 3rd generation       Other    Mixed hyperlipidemia     Controlled, with diet alone. Relevant Orders    Comprehensive metabolic panel    Vitamin D deficiency     Check a vitamin D level. Relevant Orders    Vitamin D 25 hydroxy   Other Visit Diagnoses     Health care maintenance    -  Primary          Depression Screening and Follow-up Plan: Patient was screened for depression during today's encounter. They screened negative with a PHQ-2 score of 0. Preventive health issues were discussed with patient, and age appropriate screening tests were ordered as noted in patient's After Visit Summary. Personalized health advice and appropriate referrals for health education or preventive services given if needed, as noted in patient's After Visit Summary. History of Present Illness:     Patient presents for a Medicare Wellness Visit    Patient comes in today for checkup, states she is doing well. Patient Care Team:  Jc Iverson DO as PCP - General  Navi Villavicencio PA-C     Review of Systems:     Review of Systems   Constitutional:  Negative for chills, fatigue and fever. HENT:  Negative for congestion, ear pain, hearing loss, postnasal drip, rhinorrhea and sore throat. Eyes:  Negative for pain and visual disturbance. Respiratory:  Negative for chest tightness, shortness of breath and wheezing. Cardiovascular:  Negative for chest pain and leg swelling. Gastrointestinal:  Negative for abdominal distention, abdominal pain, constipation, diarrhea and vomiting. Endocrine: Negative for cold intolerance and heat intolerance. Genitourinary:  Negative for difficulty urinating, frequency and urgency. Musculoskeletal:  Negative for arthralgias and gait problem. Skin:  Negative for color change.    Neurological: Negative for dizziness, tremors, syncope, numbness and headaches. Hematological:  Negative for adenopathy. Psychiatric/Behavioral:  Negative for agitation, confusion and sleep disturbance. The patient is not nervous/anxious. Problem List:     Patient Active Problem List   Diagnosis   • Hypothyroidism in adult   • Mixed hyperlipidemia   • Vitamin D deficiency   • Dermatitis   • BMI 36.0-36.9,adult   • Fatty liver   • Lump of right breast      Past Medical and Surgical History:     Past Medical History:   Diagnosis Date   • Colon polyp    • Disease of thyroid gland    • No known problems    • Obesity (BMI 30-39. 9)    • Obesity (BMI 30.0-34. 9)      Past Surgical History:   Procedure Laterality Date   • CHOLECYSTECTOMY     • COLONOSCOPY     • GALLBLADDER SURGERY        Family History:     Family History   Problem Relation Age of Onset   • Stroke Mother         Cerebrovascular Accident   • Diabetes Mother         Mellitus   • Heart disease Father         Cardiac Disorder   • Cancer Sister    • Thyroid disease Sister    • Breast cancer Sister         45s   • Thyroid disease Other    • No Known Problems Brother    • Heart attack Son    • No Known Problems Sister    • No Known Problems Sister    • Ovarian cancer Neg Hx    • Cervical cancer Neg Hx    • Uterine cancer Neg Hx       Social History:     Social History     Socioeconomic History   • Marital status: /Civil Union     Spouse name: None   • Number of children: None   • Years of education: None   • Highest education level: None   Occupational History   • Occupation: Retired   Tobacco Use   • Smoking status: Never   • Smokeless tobacco: Never   Vaping Use   • Vaping Use: Never used   Substance and Sexual Activity   • Alcohol use: Yes     Comment: rarely    • Drug use: No   • Sexual activity: Not Currently   Other Topics Concern   • None   Social History Narrative    Always uses seat belt    Does not use caffeine    Exercises Regularly    Live with spouse     Social Determinants of Health     Financial Resource Strain: Low Risk  (10/18/2023)    Overall Financial Resource Strain (CARDIA)    • Difficulty of Paying Living Expenses: Not very hard   Food Insecurity: Not on file   Transportation Needs: No Transportation Needs (10/18/2023)    PRAPARE - Transportation    • Lack of Transportation (Medical): No    • Lack of Transportation (Non-Medical): No   Physical Activity: Not on file   Stress: Not on file   Social Connections: Not on file   Intimate Partner Violence: Not on file   Housing Stability: Not on file      Medications and Allergies:     Current Outpatient Medications   Medication Sig Dispense Refill   • hydrOXYzine pamoate (VISTARIL) 25 mg capsule Take 1 capsule (25 mg total) by mouth 3 (three) times a day as needed (congestion) 30 capsule 0   • phentermine (ADIPEX-P) 37.5 MG tablet Take 1 tablet (37.5 mg total) by mouth in the morning 30 tablet 0   • pimecrolimus (Elidel) 1 % cream Apply topically 2 (two) times a day 30 g 1   • Synthroid 88 MCG tablet TAKE 1 TABLET BY MOUTH EVERY DAY 90 tablet 1     No current facility-administered medications for this visit.      Allergies   Allergen Reactions   • Iodine - Food Allergy Hives   • Isoflavones (Soy)    • Levofloxacin    • Shellfish Allergy - Food Allergy Hives      Immunizations:     Immunization History   Administered Date(s) Administered   • COVID-19 MODERNA VACC 0.5 ML IM 08/17/2021   • COVID-19 Moderna Vac BIVALENT 12 Yr+ IM 0.5 ML 11/04/2022   • COVID-19 Moderna mRNA Vaccine 12 Yr+ 50 mcg/0.5 mL (Spikevax) 10/10/2023      Health Maintenance:         Topic Date Due   • Hepatitis C Screening  Never done   • Breast Cancer Screening: Mammogram  10/05/2023   • Colorectal Cancer Screening  Discontinued         Topic Date Due   • Pneumococcal Vaccine: 65+ Years (1 - PCV) Never done   • Influenza Vaccine (1) Never done      Medicare Screening Tests and Risk Assessments:     Arbutus Cowden is here for her Subsequent Wellness visit. Last Medicare Wellness visit information reviewed, patient interviewed and updates made to the record today. Health Risk Assessment:   Patient rates overall health as good. Patient feels that their physical health rating is same. Patient is satisfied with their life. Eyesight was rated as same. Hearing was rated as same. Patient feels that their emotional and mental health rating is same. Patients states they are sometimes angry. Patient states they are sometimes unusually tired/fatigued. Pain experienced in the last 7 days has been none. Patient states that she has experienced no weight loss or gain in last 6 months. Depression Screening:   PHQ-2 Score: 0      Fall Risk Screening: In the past year, patient has experienced: no history of falling in past year      Urinary Incontinence Screening:   Patient has not leaked urine accidently in the last six months. Home Safety:  Patient does not have trouble with stairs inside or outside of their home. Patient has working smoke alarms and has working carbon monoxide detector. Home safety hazards include: none. Nutrition:   Current diet is Regular. Medications:   Patient is not currently taking any over-the-counter supplements. Patient is able to manage medications. Activities of Daily Living (ADLs)/Instrumental Activities of Daily Living (IADLs):   Walk and transfer into and out of bed and chair?: Yes  Dress and groom yourself?: Yes    Bathe or shower yourself?: Yes    Feed yourself? Yes  Do your laundry/housekeeping?: Yes  Manage your money, pay your bills and track your expenses?: Yes  Make your own meals?: Yes    Do your own shopping?: Yes    Previous Hospitalizations:   Any hospitalizations or ED visits within the last 12 months?: No      Advance Care Planning:   Living will: Yes    Durable POA for healthcare:  Yes    Advanced directive: Yes      Cognitive Screening:   Provider or family/friend/caregiver concerned regarding cognition?: No    PREVENTIVE SCREENINGS      Cardiovascular Screening:    General: Screening Not Indicated and History Lipid Disorder      Diabetes Screening:     General: Screening Current      Colorectal Cancer Screening:     General: Screening Current      Breast Cancer Screening:     General: Screening Current      Cervical Cancer Screening:    General: Screening Not Indicated      Osteoporosis Screening:    General: Screening Current      Abdominal Aortic Aneurysm (AAA) Screening:        General: Screening Not Indicated      Lung Cancer Screening:     General: Screening Not Indicated      Hepatitis C Screening:    General: Patient Declines    Screening, Brief Intervention, and Referral to Treatment (SBIRT)    Screening  Typical number of drinks in a day: 0  Typical number of drinks in a week: 0  Interpretation: Low risk drinking behavior. Single Item Drug Screening:  How often have you used an illegal drug (including marijuana) or a prescription medication for non-medical reasons in the past year? never    Single Item Drug Screen Score: 0  Interpretation: Negative screen for possible drug use disorder    Other Counseling Topics:   Car/seat belt/driving safety. No results found. Physical Exam:     /80 (BP Location: Left arm, Patient Position: Sitting, Cuff Size: Standard)   Pulse 73   Temp (!) 96 °F (35.6 °C) (Tympanic)   Ht 5' 4.5" (1.638 m)   Wt 97.5 kg (215 lb)   SpO2 98%   BMI 36.33 kg/m²     Physical Exam  Constitutional:       Appearance: She is well-developed. HENT:      Head: Normocephalic and atraumatic. Right Ear: External ear normal.      Left Ear: External ear normal.      Nose: Nose normal.      Mouth/Throat:      Pharynx: Oropharynx is clear. Eyes:      Extraocular Movements: Extraocular movements intact. Conjunctiva/sclera: Conjunctivae normal.      Pupils: Pupils are equal, round, and reactive to light. Neck:      Thyroid: No thyromegaly.    Cardiovascular: Rate and Rhythm: Normal rate and regular rhythm. Heart sounds: Normal heart sounds. No murmur heard. Pulmonary:      Effort: Pulmonary effort is normal.   Abdominal:      General: Bowel sounds are normal. There is no distension. Palpations: Abdomen is soft. Musculoskeletal:         General: Normal range of motion. Cervical back: Normal range of motion and neck supple. Skin:     General: Skin is warm. Neurological:      Mental Status: She is alert and oriented to person, place, and time.    Psychiatric:         Mood and Affect: Mood normal.          Bon Schaeffer DO

## 2023-10-20 DIAGNOSIS — Z12.31 ENCOUNTER FOR SCREENING MAMMOGRAM FOR MALIGNANT NEOPLASM OF BREAST: ICD-10-CM

## 2023-10-25 DIAGNOSIS — L30.9 DERMATITIS: ICD-10-CM

## 2023-10-25 RX ORDER — PIMECROLIMUS 10 MG/G
CREAM TOPICAL 2 TIMES DAILY
Qty: 30 G | Refills: 1 | Status: SHIPPED | OUTPATIENT
Start: 2023-10-25

## 2024-02-16 DIAGNOSIS — E03.9 HYPOTHYROIDISM IN ADULT: ICD-10-CM

## 2024-02-16 RX ORDER — LEVOTHYROXINE SODIUM 88 MCG
TABLET ORAL
Qty: 90 TABLET | Refills: 1 | Status: SHIPPED | OUTPATIENT
Start: 2024-02-16

## 2024-04-03 LAB
25(OH)D3 SERPL-MCNC: 24 NG/ML (ref 30–100)
ALBUMIN SERPL-MCNC: 3.9 G/DL (ref 3.6–5.1)
ALBUMIN/GLOB SERPL: 1.1 (CALC) (ref 1–2.5)
ALP SERPL-CCNC: 89 U/L (ref 37–153)
ALT SERPL-CCNC: 16 U/L (ref 6–29)
AST SERPL-CCNC: 18 U/L (ref 10–35)
BILIRUB SERPL-MCNC: 0.6 MG/DL (ref 0.2–1.2)
BUN SERPL-MCNC: 14 MG/DL (ref 7–25)
BUN/CREAT SERPL: NORMAL (CALC) (ref 6–22)
CALCIUM SERPL-MCNC: 9.3 MG/DL (ref 8.6–10.4)
CHLORIDE SERPL-SCNC: 106 MMOL/L (ref 98–110)
CO2 SERPL-SCNC: 27 MMOL/L (ref 20–32)
CREAT SERPL-MCNC: 0.73 MG/DL (ref 0.6–1)
GFR/BSA.PRED SERPLBLD CYS-BASED-ARV: 85 ML/MIN/1.73M2
GLOBULIN SER CALC-MCNC: 3.4 G/DL (CALC) (ref 1.9–3.7)
GLUCOSE SERPL-MCNC: 87 MG/DL (ref 65–99)
POTASSIUM SERPL-SCNC: 4.1 MMOL/L (ref 3.5–5.3)
PROT SERPL-MCNC: 7.3 G/DL (ref 6.1–8.1)
SODIUM SERPL-SCNC: 140 MMOL/L (ref 135–146)
TSH SERPL-ACNC: 0.28 MIU/L (ref 0.4–4.5)

## 2024-04-17 ENCOUNTER — TELEPHONE (OUTPATIENT)
Dept: FAMILY MEDICINE CLINIC | Facility: CLINIC | Age: 79
End: 2024-04-17

## 2024-04-17 ENCOUNTER — OFFICE VISIT (OUTPATIENT)
Dept: FAMILY MEDICINE CLINIC | Facility: CLINIC | Age: 79
End: 2024-04-17
Payer: MEDICARE

## 2024-04-17 VITALS
OXYGEN SATURATION: 98 % | HEIGHT: 65 IN | BODY MASS INDEX: 35.35 KG/M2 | WEIGHT: 212.2 LBS | TEMPERATURE: 98.6 F | SYSTOLIC BLOOD PRESSURE: 130 MMHG | HEART RATE: 67 BPM | DIASTOLIC BLOOD PRESSURE: 84 MMHG

## 2024-04-17 DIAGNOSIS — L30.9 DERMATITIS: ICD-10-CM

## 2024-04-17 DIAGNOSIS — E78.2 MIXED HYPERLIPIDEMIA: Primary | ICD-10-CM

## 2024-04-17 DIAGNOSIS — R42 VERTIGO: ICD-10-CM

## 2024-04-17 DIAGNOSIS — E03.9 HYPOTHYROIDISM IN ADULT: ICD-10-CM

## 2024-04-17 DIAGNOSIS — E55.9 VITAMIN D DEFICIENCY: ICD-10-CM

## 2024-04-17 PROCEDURE — G2211 COMPLEX E/M VISIT ADD ON: HCPCS | Performed by: FAMILY MEDICINE

## 2024-04-17 PROCEDURE — 99214 OFFICE O/P EST MOD 30 MIN: CPT | Performed by: FAMILY MEDICINE

## 2024-04-17 RX ORDER — PHENTERMINE HYDROCHLORIDE 37.5 MG/1
37.5 TABLET ORAL DAILY
Start: 2024-04-17

## 2024-04-17 RX ORDER — PIMECROLIMUS 10 MG/G
CREAM TOPICAL 2 TIMES DAILY
Start: 2024-04-17

## 2024-04-17 RX ORDER — LEVOTHYROXINE SODIUM 75 MCG
75 TABLET ORAL
Qty: 30 TABLET | Refills: 5 | Status: SHIPPED | OUTPATIENT
Start: 2024-04-17 | End: 2024-04-23 | Stop reason: SDUPTHER

## 2024-04-17 RX ORDER — HYDROXYZINE PAMOATE 25 MG/1
25 CAPSULE ORAL 3 TIMES DAILY PRN
Start: 2024-04-17

## 2024-04-17 RX ORDER — MULTIVIT-MIN/IRON/FOLIC ACID/K 18-600-40
2000 CAPSULE ORAL DAILY
Start: 2024-04-17

## 2024-04-17 NOTE — TELEPHONE ENCOUNTER
T/c from pt -- she was readign her AVS and noticed there were medications on there that said to stop taking but she is taking.     Phentermine - supposed to start taking    Pimecrolimus cream - is PRN   Hydroxyzine Pamoate -- is PRN as well.     Pt asking if this is able to be fixed in her chart.     Please advise

## 2024-04-17 NOTE — PROGRESS NOTES
Name: Melissa Caban      : 1945      MRN: [id number]  Encounter Provider: Russ Solitario DO  Encounter Date: 2024   Encounter department: [facility] [address]    Assessment & Plan     1. Mixed hyperlipidemia  Assessment & Plan:  Controlled with diet, check a CMP, lipid profile in 6 months    Orders:  -     Comprehensive metabolic panel; Future  -     Lipid panel; Future  -     CBC; Future    2. Hypothyroidism in adult  Assessment & Plan:  Lower her Synthroid to 75 mcg daily, check a TSH prior to next visit    Orders:  -     Synthroid 75 MCG tablet; Take 1 tablet (75 mcg total) by mouth daily in the early morning  -     TSH, 3rd generation; Future    3. Vitamin D deficiency  Assessment & Plan:  Start vitamin D 2000 units daily    Orders:  -     Vitamin D, Cholecalciferol, 50 MCG (2000 UT) CAPS; Take 2,000 Units by mouth in the morning           Subjective      Patient comes in today for a checkup.  She states that she did not start taking the phentermine at her last visit.  She was concerned taking this at the same time as trying to get her thyroid function normal.  She did get a follow-up TSH level and that was reviewed with her today.      Review of Systems   Constitutional:  Negative for chills, fatigue and fever.   HENT:  Negative for congestion, ear pain, hearing loss, postnasal drip, rhinorrhea and sore throat.    Eyes:  Negative for pain and visual disturbance.   Respiratory:  Negative for chest tightness, shortness of breath and wheezing.    Cardiovascular:  Negative for chest pain and leg swelling.   Gastrointestinal:  Negative for abdominal distention, abdominal pain, constipation, diarrhea and vomiting.   Endocrine: Negative for cold intolerance and heat intolerance.   Genitourinary:  Negative for difficulty urinating, frequency and urgency.   Musculoskeletal:  Negative for arthralgias and gait problem.   Skin:  Negative for color change.   Neurological:   "Negative for dizziness, tremors, syncope, numbness and headaches.   Hematological:  Negative for adenopathy.   Psychiatric/Behavioral:  Negative for agitation, confusion and sleep disturbance. The patient is not nervous/anxious.        Current Outpatient Medications on File Prior to Visit   Medication Sig   • [DISCONTINUED] Synthroid 88 MCG tablet TAKE 1 TABLET BY MOUTH EVERY DAY   • [DISCONTINUED] hydrOXYzine pamoate (VISTARIL) 25 mg capsule Take 1 capsule (25 mg total) by mouth 3 (three) times a day as needed (congestion) (Patient not taking: Reported on 4/17/2024)   • [DISCONTINUED] phentermine (ADIPEX-P) 37.5 MG tablet Take 1 tablet (37.5 mg total) by mouth in the morning (Patient not taking: Reported on 4/17/2024)   • [DISCONTINUED] pimecrolimus (Elidel) 1 % cream Apply topically 2 (two) times a day (Patient not taking: Reported on 4/17/2024)       Objective     /84 (BP Location: Left arm, Patient Position: Sitting, Cuff Size: Standard)   Pulse 67   Temp 98.6 °F (37 °C) (Tympanic)   Ht 5' 4.5\" (1.638 m)   Wt 96.3 kg (212 lb 3.2 oz)   SpO2 98%   BMI 35.86 kg/m²     Physical Exam  Vitals and nursing note reviewed.   Constitutional:       General: She is not in acute distress.     Appearance: Normal appearance. She is well-developed.   HENT:      Head: Normocephalic.   Eyes:      General: No scleral icterus.     Conjunctiva/sclera: Conjunctivae normal.   Neck:      Thyroid: No thyromegaly.   Cardiovascular:      Rate and Rhythm: Normal rate and regular rhythm.      Heart sounds: Normal heart sounds. No murmur heard.  Pulmonary:      Effort: Pulmonary effort is normal. No respiratory distress.      Breath sounds: Normal breath sounds. No wheezing.   Abdominal:      General: Bowel sounds are normal. There is no distension.      Palpations: Abdomen is soft.      Tenderness: There is no abdominal tenderness.   Musculoskeletal:         General: No tenderness. Normal range of motion.      Cervical back: " Normal range of motion.      Right lower leg: No edema.      Left lower leg: No edema.   Lymphadenopathy:      Cervical: No cervical adenopathy.   Skin:     General: Skin is warm and dry.      Coloration: Skin is not pale.      Findings: No rash.   Neurological:      Mental Status: She is alert and oriented to person, place, and time.      Cranial Nerves: No cranial nerve deficit.   Psychiatric:         Behavior: Behavior normal.       Russ Solitario DO

## 2024-04-23 ENCOUNTER — TELEPHONE (OUTPATIENT)
Age: 79
End: 2024-04-23

## 2024-04-23 DIAGNOSIS — E03.9 HYPOTHYROIDISM IN ADULT: ICD-10-CM

## 2024-04-23 RX ORDER — LEVOTHYROXINE SODIUM 75 MCG
75 TABLET ORAL
Qty: 90 TABLET | Refills: 1 | Status: SHIPPED | OUTPATIENT
Start: 2024-04-23

## 2024-04-23 NOTE — TELEPHONE ENCOUNTER
Pt recently had levothyroxine dosing reduced due to abnormal TSH. Dr. Solitario sent in 30 day supply of Synthroid but pt normally does 90 day. Pharmacy unable to change quantity without new order. Pt at pharmacy now.

## 2024-08-16 ENCOUNTER — TELEPHONE (OUTPATIENT)
Age: 79
End: 2024-08-16

## 2024-09-25 DIAGNOSIS — E03.9 HYPOTHYROIDISM IN ADULT: ICD-10-CM

## 2024-09-25 RX ORDER — LEVOTHYROXINE SODIUM 75 MCG
75 TABLET ORAL
Qty: 90 TABLET | Refills: 1 | Status: SHIPPED | OUTPATIENT
Start: 2024-09-25

## 2024-10-28 DIAGNOSIS — E03.9 HYPOTHYROIDISM IN ADULT: ICD-10-CM

## 2024-10-28 NOTE — TELEPHONE ENCOUNTER
Medication: Synthroid 75 MCG tablet     Dose/Frequency: TAKE 1 TABLET (75 MCG TOTAL) BY MOUTH DAILY IN THE EARLY MORNING     Quantity: 90 tablets    Pharmacy: [facility]/pharmacy #[id number] - [place] - [address]     Office:   [x] PCP/Provider -   [] Speciality/Provider -     Does the patient have enough for 3 days?   [x] Yes   [] No - Send as HP to POD

## 2024-10-29 RX ORDER — LEVOTHYROXINE SODIUM 75 MCG
75 TABLET ORAL
Qty: 90 TABLET | Refills: 1 | Status: SHIPPED | OUTPATIENT
Start: 2024-10-29

## 2024-10-31 LAB
ALBUMIN SERPL-MCNC: 4 G/DL (ref 3.6–5.1)
ALBUMIN/GLOB SERPL: 1.3 (CALC) (ref 1–2.5)
ALP SERPL-CCNC: 75 U/L (ref 37–153)
ALT SERPL-CCNC: 17 U/L (ref 6–29)
AST SERPL-CCNC: 19 U/L (ref 10–35)
BASOPHILS # BLD AUTO: 61 CELLS/UL (ref 0–200)
BASOPHILS NFR BLD AUTO: 1 %
BILIRUB SERPL-MCNC: 0.5 MG/DL (ref 0.2–1.2)
BUN SERPL-MCNC: 15 MG/DL (ref 7–25)
BUN/CREAT SERPL: NORMAL (CALC) (ref 6–22)
CALCIUM SERPL-MCNC: 9.1 MG/DL (ref 8.6–10.4)
CHLORIDE SERPL-SCNC: 105 MMOL/L (ref 98–110)
CHOLEST SERPL-MCNC: 196 MG/DL
CHOLEST/HDLC SERPL: 3.8 (CALC)
CO2 SERPL-SCNC: 27 MMOL/L (ref 20–32)
CREAT SERPL-MCNC: 0.8 MG/DL (ref 0.6–1)
EOSINOPHIL # BLD AUTO: 214 CELLS/UL (ref 15–500)
EOSINOPHIL NFR BLD AUTO: 3.5 %
ERYTHROCYTE [DISTWIDTH] IN BLOOD BY AUTOMATED COUNT: 13.4 % (ref 11–15)
GFR/BSA.PRED SERPLBLD CYS-BASED-ARV: 76 ML/MIN/1.73M2
GLOBULIN SER CALC-MCNC: 3.1 G/DL (CALC) (ref 1.9–3.7)
GLUCOSE SERPL-MCNC: 89 MG/DL (ref 65–99)
HCT VFR BLD AUTO: 41.5 % (ref 35–45)
HDLC SERPL-MCNC: 51 MG/DL
HGB BLD-MCNC: 13.3 G/DL (ref 11.7–15.5)
LDLC SERPL CALC-MCNC: 125 MG/DL (CALC)
LYMPHOCYTES # BLD AUTO: 2257 CELLS/UL (ref 850–3900)
LYMPHOCYTES NFR BLD AUTO: 37 %
MCH RBC QN AUTO: 28.1 PG (ref 27–33)
MCHC RBC AUTO-ENTMCNC: 32 G/DL (ref 32–36)
MCV RBC AUTO: 87.7 FL (ref 80–100)
MONOCYTES # BLD AUTO: 494 CELLS/UL (ref 200–950)
MONOCYTES NFR BLD AUTO: 8.1 %
NEUTROPHILS # BLD AUTO: 3074 CELLS/UL (ref 1500–7800)
NEUTROPHILS NFR BLD AUTO: 50.4 %
NONHDLC SERPL-MCNC: 145 MG/DL (CALC)
PLATELET # BLD AUTO: 300 THOUSAND/UL (ref 140–400)
PMV BLD REES-ECKER: 9.8 FL (ref 7.5–12.5)
POTASSIUM SERPL-SCNC: 3.9 MMOL/L (ref 3.5–5.3)
PROT SERPL-MCNC: 7.1 G/DL (ref 6.1–8.1)
RBC # BLD AUTO: 4.73 MILLION/UL (ref 3.8–5.1)
SODIUM SERPL-SCNC: 140 MMOL/L (ref 135–146)
TRIGL SERPL-MCNC: 96 MG/DL
TSH SERPL-ACNC: 4.01 MIU/L (ref 0.4–4.5)
WBC # BLD AUTO: 6.1 THOUSAND/UL (ref 3.8–10.8)

## 2024-11-04 ENCOUNTER — OFFICE VISIT (OUTPATIENT)
Dept: FAMILY MEDICINE CLINIC | Facility: CLINIC | Age: 79
End: 2024-11-04
Payer: MEDICARE

## 2024-11-04 VITALS
SYSTOLIC BLOOD PRESSURE: 118 MMHG | DIASTOLIC BLOOD PRESSURE: 74 MMHG | BODY MASS INDEX: 34.16 KG/M2 | WEIGHT: 205 LBS | HEART RATE: 84 BPM | HEIGHT: 65 IN | OXYGEN SATURATION: 97 %

## 2024-11-04 DIAGNOSIS — E28.39 MENOPAUSE OVARIAN FAILURE: ICD-10-CM

## 2024-11-04 DIAGNOSIS — E03.9 HYPOTHYROIDISM IN ADULT: ICD-10-CM

## 2024-11-04 DIAGNOSIS — Z00.00 HEALTH CARE MAINTENANCE: Primary | ICD-10-CM

## 2024-11-04 DIAGNOSIS — E78.2 MIXED HYPERLIPIDEMIA: ICD-10-CM

## 2024-11-04 PROCEDURE — 99213 OFFICE O/P EST LOW 20 MIN: CPT | Performed by: FAMILY MEDICINE

## 2024-11-04 PROCEDURE — G0439 PPPS, SUBSEQ VISIT: HCPCS | Performed by: FAMILY MEDICINE

## 2024-11-04 NOTE — PROGRESS NOTES
Ambulatory Visit  Name: Melissa Caban      : 1945      MRN: [id number]  Encounter Provider: Russ Solitario DO  Encounter Date: 2024   Encounter department: [facility] [address]    Assessment & Plan  Health care maintenance         Hypothyroidism in adult  Controlled, continue the Synthroid, follow-up in 6 months with a TSH done prior.  Orders:    TSH, 3rd generation; Future    Mixed hyperlipidemia  Controlled with diet, continue to monitor on a yearly basis.       Menopause ovarian failure    Orders:    DXA bone density spine hip and pelvis; Future       Preventive health issues were discussed with patient, and age appropriate screening tests were ordered as noted in patient's After Visit Summary. Personalized health advice and appropriate referrals for health education or preventive services given if needed, as noted in patient's After Visit Summary.    History of Present Illness     Patient comes in today for checkup, states he is doing well, no specific complaints.       Patient Care Team:  Russ Solitario DO as PCP - General  Fely Calzada PA-C    Review of Systems   Constitutional:  Negative for chills, fatigue and fever.   HENT:  Negative for congestion, ear pain, hearing loss, postnasal drip, rhinorrhea and sore throat.    Eyes:  Negative for pain and visual disturbance.   Respiratory:  Negative for chest tightness, shortness of breath and wheezing.    Cardiovascular:  Negative for chest pain and leg swelling.   Gastrointestinal:  Negative for abdominal distention, abdominal pain, constipation, diarrhea and vomiting.   Endocrine: Negative for cold intolerance and heat intolerance.   Genitourinary:  Negative for difficulty urinating, frequency and urgency.   Musculoskeletal:  Negative for arthralgias and gait problem.   Skin:  Negative for color change.   Neurological:  Negative for dizziness, tremors, syncope, numbness and headaches.   Hematological:   Negative for adenopathy.   Psychiatric/Behavioral:  Negative for agitation, confusion and sleep disturbance. The patient is not nervous/anxious.      Medical History Reviewed by provider this encounter:       Annual Wellness Visit Questionnaire   Melissa is here for her Subsequent Wellness visit. Last Medicare Wellness visit information reviewed, patient interviewed and updates made to the record today.      Health Risk Assessment:   Patient rates overall health as very good. Patient feels that their physical health rating is same. Patient is very satisfied with their life. Eyesight was rated as same. Hearing was rated as same. Patient feels that their emotional and mental health rating is same. Patients states they are never, rarely angry. Patient states they are never, rarely unusually tired/fatigued. Pain experienced in the last 7 days has been some. Patient's pain rating has been 5/10. Patient states that she has experienced no weight loss or gain in last 6 months. Left lower arm pain     Depression Screening:   PHQ-2 Score: 0      Fall Risk Screening:   In the past year, patient has experienced: no history of falling in past year      Urinary Incontinence Screening:   Patient has not leaked urine accidently in the last six months.     Home Safety:  Patient does not have trouble with stairs inside or outside of their home. Patient has working smoke alarms and has working carbon monoxide detector. Home safety hazards include: none.     Nutrition:   Current diet is Regular.     Medications:   Patient is not currently taking any over-the-counter supplements. Patient is able to manage medications.     Activities of Daily Living (ADLs)/Instrumental Activities of Daily Living (IADLs):   Walk and transfer into and out of bed and chair?: Yes  Dress and groom yourself?: Yes    Bathe or shower yourself?: Yes    Feed yourself? Yes  Do your laundry/housekeeping?: Yes  Manage your money, pay your bills and track your  expenses?: Yes  Make your own meals?: Yes    Do your own shopping?: Yes    Previous Hospitalizations:   Any hospitalizations or ED visits within the last 12 months?: No      Cognitive Screening:   Provider or family/friend/caregiver concerned regarding cognition?: No    PREVENTIVE SCREENINGS      Cardiovascular Screening:    General: Screening Not Indicated and History Lipid Disorder      Diabetes Screening:     General: Screening Current      Colorectal Cancer Screening:     General: Screening Not Indicated      Breast Cancer Screening:     General: Screening Current      Cervical Cancer Screening:    General: Screening Not Indicated      Osteoporosis Screening:      Due for: DXA Axial      Abdominal Aortic Aneurysm (AAA) Screening:        General: Screening Not Indicated      Lung Cancer Screening:     General: Screening Not Indicated      Hepatitis C Screening:    General: Screening Not Indicated    Screening, Brief Intervention, and Referral to Treatment (SBIRT)    Screening  Typical number of drinks in a day: 0  Typical number of drinks in a week: 0  Interpretation: Low risk drinking behavior.    Single Item Drug Screening:  How often have you used an illegal drug (including marijuana) or a prescription medication for non-medical reasons in the past year? never    Single Item Drug Screen Score: 0  Interpretation: Negative screen for possible drug use disorder    Social Determinants of Health     Financial Resource Strain: Low Risk  (10/18/2023)    Overall Financial Resource Strain (CARDIA)     Difficulty of Paying Living Expenses: Not very hard   Transportation Needs: No Transportation Needs (10/18/2023)    PRAPARE - Transportation     Lack of Transportation (Medical): No     Lack of Transportation (Non-Medical): No    Received from [employer] [facility]/     No results found.    Objective     There were no vitals taken for this visit.    Physical Exam  Constitutional:       Appearance: She is well-developed.    HENT:      Head: Normocephalic and atraumatic.      Right Ear: External ear normal.      Left Ear: External ear normal.      Nose: Nose normal.      Mouth/Throat:      Pharynx: Oropharynx is clear.   Eyes:      Extraocular Movements: Extraocular movements intact.      Conjunctiva/sclera: Conjunctivae normal.      Pupils: Pupils are equal, round, and reactive to light.   Neck:      Thyroid: No thyromegaly.   Cardiovascular:      Rate and Rhythm: Normal rate and regular rhythm.      Heart sounds: Normal heart sounds. No murmur heard.  Pulmonary:      Effort: Pulmonary effort is normal.   Abdominal:      General: Bowel sounds are normal. There is no distension.      Palpations: Abdomen is soft.   Musculoskeletal:         General: Normal range of motion.      Cervical back: Normal range of motion and neck supple.   Skin:     General: Skin is warm.   Neurological:      Mental Status: She is alert and oriented to person, place, and time.   Psychiatric:         Mood and Affect: Mood normal.

## 2024-11-04 NOTE — ASSESSMENT & PLAN NOTE
Controlled, continue the Synthroid, follow-up in 6 months with a TSH done prior.  Orders:    TSH, 3rd generation; Future

## 2024-11-04 NOTE — PATIENT INSTRUCTIONS
Medicare Preventive Visit Patient Instructions  Thank you for completing your Welcome to Medicare Visit or Medicare Annual Wellness Visit today. Your next wellness visit will be due in one year (11/5/2025).  The screening/preventive services that you may require over the next 5-10 years are detailed below. Some tests may not apply to you based off risk factors and/or age. Screening tests ordered at today's visit but not completed yet may show as past due. Also, please note that scanned in results may not display below.  Preventive Screenings:  Service Recommendations Previous Testing/Comments   Colorectal Cancer Screening  * Colonoscopy    * Fecal Occult Blood Test (FOBT)/Fecal Immunochemical Test (FIT)  * Fecal DNA/Cologuard Test  * Flexible Sigmoidoscopy Age: 45-75 years old   Colonoscopy: every 10 years (may be performed more frequently if at higher risk)  OR  FOBT/FIT: every 1 year  OR  Cologuard: every 3 years  OR  Sigmoidoscopy: every 5 years  Screening may be recommended earlier than age 45 if at higher risk for colorectal cancer. Also, an individualized decision between you and your healthcare provider will decide whether screening between the ages of 76-85 would be appropriate. Colonoscopy: 10/21/2019  FOBT/FIT: Not on file  Cologuard: Not on file  Sigmoidoscopy: Not on file          Breast Cancer Screening Age: 40+ years old  Frequency: every 1-2 years  Not required if history of left and right mastectomy Mammogram: 10/31/2024    Screening Current   Cervical Cancer Screening Between the ages of 21-29, pap smear recommended once every 3 years.   Between the ages of 30-65, can perform pap smear with HPV co-testing every 5 years.   Recommendations may differ for women with a history of total hysterectomy, cervical cancer, or abnormal pap smears in past. Pap Smear: Not on file    Screening Not Indicated   Hepatitis C Screening Once for adults born between 1945 and 1965  More frequently in patients at high risk  for Hepatitis C Hep C Antibody: Not on file        Diabetes Screening 1-2 times per year if you're at risk for diabetes or have pre-diabetes Fasting glucose: No results in last 5 years (No results in last 5 years)  A1C: No results in last 5 years (No results in last 5 years)  Screening Current   Cholesterol Screening Once every 5 years if you don't have a lipid disorder. May order more often based on risk factors. Lipid panel: 10/30/2024    Screening Not Indicated  History Lipid Disorder     Other Preventive Screenings Covered by Medicare:  Abdominal Aortic Aneurysm (AAA) Screening: covered once if your at risk. You're considered to be at risk if you have a family history of AAA.  Lung Cancer Screening: covers low dose CT scan once per year if you meet all of the following conditions: (1) Age 55-77; (2) No signs or symptoms of lung cancer; (3) Current smoker or have quit smoking within the last 15 years; (4) You have a tobacco smoking history of at least 20 pack years (packs per day multiplied by number of years you smoked); (5) You get a written order from a healthcare provider.  Glaucoma Screening: covered annually if you're considered high risk: (1) You have diabetes OR (2) Family history of glaucoma OR (3)  aged 50 and older OR (4)  American aged 65 and older  Osteoporosis Screening: covered every 2 years if you meet one of the following conditions: (1) You're estrogen deficient and at risk for osteoporosis based off medical history and other findings; (2) Have a vertebral abnormality; (3) On glucocorticoid therapy for more than 3 months; (4) Have primary hyperparathyroidism; (5) On osteoporosis medications and need to assess response to drug therapy.   Last bone density test (DXA Scan): 09/09/2022.  HIV Screening: covered annually if you're between the age of 15-65. Also covered annually if you are younger than 15 and older than 65 with risk factors for HIV infection. For pregnant  patients, it is covered up to 3 times per pregnancy.    Immunizations:  Immunization Recommendations   Influenza Vaccine Annual influenza vaccination during flu season is recommended for all persons aged >= 6 months who do not have contraindications   Pneumococcal Vaccine   * Pneumococcal conjugate vaccine = PCV13 (Prevnar 13), PCV15 (Vaxneuvance), PCV20 (Prevnar 20)  * Pneumococcal polysaccharide vaccine = PPSV23 (Pneumovax) Adults 19-63 yo with certain risk factors or if 65+ yo  If never received any pneumonia vaccine: recommend Prevnar 20 (PCV20)  Give PCV20 if previously received 1 dose of PCV13 or PPSV23   Hepatitis B Vaccine 3 dose series if at intermediate or high risk (ex: diabetes, end stage renal disease, liver disease)   Respiratory syncytial virus (RSV) Vaccine - COVERED BY MEDICARE PART D  * RSVPreF3 (Arexvy) CDC recommends that adults 60 years of age and older may receive a single dose of RSV vaccine using shared clinical decision-making (SCDM)   Tetanus (Td) Vaccine - COST NOT COVERED BY MEDICARE PART B Following completion of primary series, a booster dose should be given every 10 years to maintain immunity against tetanus. Td may also be given as tetanus wound prophylaxis.   Tdap Vaccine - COST NOT COVERED BY MEDICARE PART B Recommended at least once for all adults. For pregnant patients, recommended with each pregnancy.   Shingles Vaccine (Shingrix) - COST NOT COVERED BY MEDICARE PART B  2 shot series recommended in those 19 years and older who have or will have weakened immune systems or those 50 years and older     Health Maintenance Due:      Topic Date Due   • Hepatitis C Screening  Never done   • Breast Cancer Screening: Mammogram  10/31/2025   • Colorectal Cancer Screening  Discontinued     Immunizations Due:      Topic Date Due   • Pneumococcal Vaccine: 65+ Years (1 of 1 - PCV) Never done   • Influenza Vaccine (1) Never done     Advance Directives   What are advance directives?  Advance  directives are legal documents that state your wishes and plans for medical care. These plans are made ahead of time in case you lose your ability to make decisions for yourself. Advance directives can apply to any medical decision, such as the treatments you want, and if you want to donate organs.   What are the types of advance directives?  There are many types of advance directives, and each state has rules about how to use them. You may choose a combination of any of the following:  Living will:  This is a written record of the treatment you want. You can also choose which treatments you do not want, which to limit, and which to stop at a certain time. This includes surgery, medicine, IV fluid, and tube feedings.   Durable power of  for healthcare (DPAHC):  This is a written record that states who you want to make healthcare choices for you when you are unable to make them for yourself. This person, called a proxy, is usually a family member or a friend. You may choose more than 1 proxy.  Do not resuscitate (DNR) order:  A DNR order is used in case your heart stops beating or you stop breathing. It is a request not to have certain forms of treatment, such as CPR. A DNR order may be included in other types of advance directives.  Medical directive:  This covers the care that you want if you are in a coma, near death, or unable to make decisions for yourself. You can list the treatments you want for each condition. Treatment may include pain medicine, surgery, blood transfusions, dialysis, IV or tube feedings, and a ventilator (breathing machine).  Values history:  This document has questions about your views, beliefs, and how you feel and think about life. This information can help others choose the care that you would choose.  Why are advance directives important?  An advance directive helps you control your care. Although spoken wishes may be used, it is better to have your wishes written down. Spoken  wishes can be misunderstood, or not followed. Treatments may be given even if you do not want them. An advance directive may make it easier for your family to make difficult choices about your care.   Weight Management   Why it is important to manage your weight:  Being overweight increases your risk of health conditions such as heart disease, high blood pressure, type 2 diabetes, and certain types of cancer. It can also increase your risk for osteoarthritis, sleep apnea, and other respiratory problems. Aim for a slow, steady weight loss. Even a small amount of weight loss can lower your risk of health problems.  How to lose weight safely:  A safe and healthy way to lose weight is to eat fewer calories and get regular exercise. You can lose up about 1 pound a week by decreasing the number of calories you eat by 500 calories each day.   Healthy meal plan for weight management:  A healthy meal plan includes a variety of foods, contains fewer calories, and helps you stay healthy. A healthy meal plan includes the following:  Eat whole-grain foods more often.  A healthy meal plan should contain fiber. Fiber is the part of grains, fruits, and vegetables that is not broken down by your body. Whole-grain foods are healthy and provide extra fiber in your diet. Some examples of whole-grain foods are whole-wheat breads and pastas, oatmeal, brown rice, and bulgur.  Eat a variety of vegetables every day.  Include dark, leafy greens such as spinach, kale, jennifer greens, and mustard greens. Eat yellow and orange vegetables such as carrots, sweet potatoes, and winter squash.   Eat a variety of fruits every day.  Choose fresh or canned fruit (canned in its own juice or light syrup) instead of juice. Fruit juice has very little or no fiber.  Eat low-fat dairy foods.  Drink fat-free (skim) milk or 1% milk. Eat fat-free yogurt and low-fat cottage cheese. Try low-fat cheeses such as mozzarella and other reduced-fat cheeses.  Choose  meat and other protein foods that are low in fat.  Choose beans or other legumes such as split peas or lentils. Choose fish, skinless poultry (chicken or turkey), or lean cuts of red meat (beef or pork). Before you cook meat or poultry, cut off any visible fat.   Use less fat and oil.  Try baking foods instead of frying them. Add less fat, such as margarine, sour cream, regular salad dressing and mayonnaise to foods. Eat fewer high-fat foods. Some examples of high-fat foods include french fries, doughnuts, ice cream, and cakes.  Eat fewer sweets.  Limit foods and drinks that are high in sugar. This includes candy, cookies, regular soda, and sweetened drinks.  Exercise:  Exercise at least 30 minutes per day on most days of the week. Some examples of exercise include walking, biking, dancing, and swimming. You can also fit in more physical activity by taking the stairs instead of the elevator or parking farther away from stores. Ask your healthcare provider about the best exercise plan for you.      © Copyright [employer] 2018 Information is for End User's use only and may not be sold, redistributed or otherwise used for commercial purposes. All illustrations and images included in CareNotes® are the copyrighted property of A.D.A.M., Inc. or [employer]

## 2024-12-06 ENCOUNTER — TELEPHONE (OUTPATIENT)
Dept: FAMILY MEDICINE CLINIC | Facility: CLINIC | Age: 79
End: 2024-12-06

## 2024-12-06 RX ORDER — PHENTERMINE HYDROCHLORIDE 37.5 MG/1
37.5 TABLET ORAL DAILY
Qty: 30 TABLET | Refills: 0 | Status: SHIPPED | OUTPATIENT
Start: 2024-12-06

## 2024-12-06 NOTE — TELEPHONE ENCOUNTER
Patient called the RX Refill Line. Message is being forwarded to the office.     Patient is requesting  a refill for phentermine (ADIPEX-P) 37.5 MG tablet. The medication is not on patients current medication list. Patient stated that she would like to restart the medication. Patient would like to be contacted if there are any further questions    Patient uses [facility]/pharmacy #[id number] - [place] - [address]     Please contact patient at [phone]

## 2024-12-17 ENCOUNTER — HOSPITAL ENCOUNTER (OUTPATIENT)
Age: 79
Discharge: HOME | End: 2024-12-17
Payer: MEDICARE

## 2024-12-17 VITALS — HEIGHT: 64 IN | BODY MASS INDEX: 35.46 KG/M2

## 2024-12-17 DIAGNOSIS — E28.39 MENOPAUSE OVARIAN FAILURE: ICD-10-CM

## 2024-12-17 PROCEDURE — 77080 DXA BONE DENSITY AXIAL: CPT

## 2024-12-18 ENCOUNTER — RESULTS FOLLOW-UP (OUTPATIENT)
Dept: FAMILY MEDICINE CLINIC | Facility: CLINIC | Age: 79
End: 2024-12-18

## 2025-02-26 ENCOUNTER — OFFICE VISIT (OUTPATIENT)
Dept: BARIATRICS | Facility: CLINIC | Age: 80
End: 2025-02-26
Payer: MEDICARE

## 2025-02-26 VITALS
OXYGEN SATURATION: 95 % | HEIGHT: 64 IN | SYSTOLIC BLOOD PRESSURE: 122 MMHG | BODY MASS INDEX: 36.88 KG/M2 | HEART RATE: 63 BPM | DIASTOLIC BLOOD PRESSURE: 70 MMHG | WEIGHT: 216 LBS

## 2025-02-26 DIAGNOSIS — E66.812 OBESITY, CLASS II, BMI 35-39.9: Primary | ICD-10-CM

## 2025-02-26 DIAGNOSIS — K76.0 FATTY LIVER: ICD-10-CM

## 2025-02-26 PROCEDURE — 99204 OFFICE O/P NEW MOD 45 MIN: CPT | Performed by: PHYSICAL THERAPIST

## 2025-02-26 RX ORDER — MULTIVITAMIN WITH IRON
500 TABLET ORAL
COMMUNITY

## 2025-02-26 NOTE — ASSESSMENT & PLAN NOTE
- Discussed options of HealthyCORE-Intensive Lifestyle Intervention Program, Very Low Calorie Diet-VLCD, and Conservative Program and the role of weight loss medications.  - Patient is interested in pursuing HealthyCORE-Intensive Lifestyle Intervention Program and follow up visits with medical weight management provider.  - Explained the importance of making lifestyle changes in addition to starting any anti-obesity medications.   - Initial weight loss goal of 5-10% weight loss for improved health. Weight loss can improve patient's co-morbid conditions and/or prevent weight-related complications.  - Weight is not at goal and patient has been unable to achieve a meaningful weight loss above 5% using various programs and tools for more than 6 months  - Labs reviewed.    General Recommendations:  Nutrition:  Eat breakfast daily.  Do not skip meals.      Food log (ie.) www.myfitnesspal.com, sparkpeople.com, loseit.com, calorieking.com, etc.     Practice mindful eating.  Be sure to set aside time to eat, eat slowly, and savor your food.     Hydration:    At least 64oz of water daily.  No sugar sweetened beverages.  No juice (eat the fruit instead).     Exercise:  Studies have shown that the ideal exercise goal is somewhere between 150 to 300 minutes of moderate intensity exercise a week.  Start with exercising 10 minutes every other day and gradually increase physical activity with a goal of at least 150 minutes of moderate intensity exercise a week, divided over at least 3 days a week.  An example of this would be exercising 30 minutes a day, 5 days a week.  Resistance training can increase muscle mass and increase our resting metabolic rate.   FULL BODY resistance training is recommended 2-3 times a week.  Do not do this on consecutive days to allow for muscle recovery.     Aim for a bare minimum 5000 steps, even on days you do not exercise.     Monitoring:   Weigh yourself daily.  If this causes undue stress, then  just weigh yourself once a week.  Weigh yourself the same time of the day with the same amount of clothing on.  Preferably this should be done after waking up, before you eat, and with no clothing or minimal clothing on.     Specific Goals:  Calorie goal:  1500 justin/day (Provided with meal plan to follow)

## 2025-02-26 NOTE — PROGRESS NOTES
Assessment/Plan:    Obesity, Class II, BMI 35-39.9  - Discussed options of HealthyCORE-Intensive Lifestyle Intervention Program, Very Low Calorie Diet-VLCD, and Conservative Program and the role of weight loss medications.  - Patient is interested in pursuing HealthyCORE-Intensive Lifestyle Intervention Program and follow up visits with medical weight management provider.  - Explained the importance of making lifestyle changes in addition to starting any anti-obesity medications.   - Initial weight loss goal of 5-10% weight loss for improved health. Weight loss can improve patient's co-morbid conditions and/or prevent weight-related complications.  - Weight is not at goal and patient has been unable to achieve a meaningful weight loss above 5% using various programs and tools for more than 6 months  - Labs reviewed.    General Recommendations:  Nutrition:  Eat breakfast daily.  Do not skip meals.      Food log (ie.) www.[employer].com, sparkpeople.com, loseit.com, [employer].com, etc.     Practice mindful eating.  Be sure to set aside time to eat, eat slowly, and savor your food.     Hydration:    At least 64oz of water daily.  No sugar sweetened beverages.  No juice (eat the fruit instead).     Exercise:  Studies have shown that the ideal exercise goal is somewhere between 150 to 300 minutes of moderate intensity exercise a week.  Start with exercising 10 minutes every other day and gradually increase physical activity with a goal of at least 150 minutes of moderate intensity exercise a week, divided over at least 3 days a week.  An example of this would be exercising 30 minutes a day, 5 days a week.  Resistance training can increase muscle mass and increase our resting metabolic rate.   FULL BODY resistance training is recommended 2-3 times a week.  Do not do this on consecutive days to allow for muscle recovery.     Aim for a bare minimum 5000 steps, even on days you do not exercise.     Monitoring:   Weigh  yourself daily.  If this causes undue stress, then just weigh yourself once a week.  Weigh yourself the same time of the day with the same amount of clothing on.  Preferably this should be done after waking up, before you eat, and with no clothing or minimal clothing on.     Specific Goals:  Calorie goal:  1500 justin/day (Provided with meal plan to follow)         Melissa was seen today for consult.    Diagnoses and all orders for this visit:    Obesity, Class II, BMI 35-39.9    BMI 36.0-36.9,adult    Fatty liver           Total time spent reviewing chart, interviewing patient, examining patient, discussing plan, answering all questions, and documentin min, with >50% face-to-face time spent counseling patient on nonsurgical interventions for the treatment of excess weight. Discussed in detail nonsurgical options including intensive lifestyle intervention program, very low-calorie diet program and conservative program.  Discussed the role of weight loss medications.  Counseled patient on diet behavior and exercise modification for weight loss.    Follow up in approximately 3 months with Non-Surgical Physician/Advanced Practitioner.    Subjective:   Chief Complaint   Patient presents with    Consult     Initial visit with provider, Ryan Brown 15  Waist 44  SB= 3/8       Patient ID: Melissa Caban  is a 79 y.o. female with excess weight/obesity here to pursue weight management.  Previous notes and records have been reviewed. Prescribed Phentermine by PCP last year with some success. Was doing intermittent fasting.      Past Medical History:   Diagnosis Date    Colon polyp     Disease of thyroid gland     No known problems     Obesity (BMI 30-39.9)     Obesity (BMI 30.0-34.9)      Past Surgical History:   Procedure Laterality Date    CHOLECYSTECTOMY      COLONOSCOPY      GALLBLADDER SURGERY         HPI:  Wt Readings from Last 20 Encounters:   25 98 kg (216 lb)   24 93 kg (205 lb)   24 96.3 kg (212 lb  3.2 oz)   10/18/23 97.5 kg (215 lb)   09/29/23 97.1 kg (214 lb)   10/12/22 101 kg (223 lb)   04/08/22 98 kg (216 lb)   09/09/21 98.9 kg (218 lb)   07/28/21 98.5 kg (217 lb 3.2 oz)   07/21/21 97.3 kg (214 lb 9.6 oz)   07/19/21 96.2 kg (212 lb)   07/02/21 96.2 kg (212 lb)   05/19/21 94.9 kg (209 lb 4.8 oz)   05/13/21 95.3 kg (210 lb 3.2 oz)   05/05/21 94.3 kg (208 lb)   03/25/21 93 kg (205 lb)   03/17/21 93.4 kg (206 lb)   03/03/21 93.5 kg (206 lb 3.2 oz)   02/25/21 93.7 kg (206 lb 8 oz)   02/18/21 94.1 kg (207 lb 8 oz)       Patient presents today to medical weight management office for consult.      Starting MWM weight: 216 lbs (2/26/2025)  Starting BMI: 37.37 (2/26/2025)  Goal weight: 160 lbs      Obesity/Excess Weight:  Severity: Severe  Onset:  adult life    Modifiers: Diet and Exercise, Commercial Weight Loss Programs-ie. Weight Watchers, Right On Interactiveig, Nutrisystem, etc., and Prescription Weight Loss Medications  Contributing factors: Poor Food Choices, Insufficient Physical Activity, and Insufficient time to make appropriate lifestyle changes  Associated symptoms: comorbid conditions, fatigue, increased joint pain, decreased exercise capacity, and clothes do not fit      Pertinent PMHx: None      Diet recall:  B: eggs, egg mcmuffin at home, waffle, cereal (eats later 10:30)  L: skips  D: chicken, veggies, starch   S: popcorn, pistachios, fruit cups, grapes, strawberries  Take out frequency: 2x    Hydration: minimal intake, green tea with moi diet, apple juice, cranberry juice  Alcohol: None   Smoking: None  Exercise: recently joined [employer]; will try for 3 days  Occupation: Retired  Sleep: falls asleep after dinner, wakes and has difficulty falling back asleep; about 6-7 hrs per night    STOP bang: 3/8    Colonoscopy: UTD  Mammogram: UTD    The following portions of the patient's history were reviewed and updated as appropriate: allergies, current medications, past family history, past medical history,  "past social history, past surgical history, and problem list.    Family History   Problem Relation Age of Onset    Stroke Mother         Cerebrovascular Accident    Diabetes Mother         Mellitus    Heart disease Father         Cardiac Disorder    Cancer Sister     Thyroid disease Sister     Breast cancer Sister         40s    Thyroid disease Other     No Known Problems Brother     Heart attack Son     No Known Problems Sister     No Known Problems Sister     Ovarian cancer Neg Hx     Cervical cancer Neg Hx     Uterine cancer Neg Hx         Review of Systems   Constitutional:  Negative for chills and fever.   HENT:  Negative for ear pain and sore throat.    Eyes:  Negative for pain and visual disturbance.   Respiratory:  Negative for cough and shortness of breath.    Cardiovascular:  Negative for chest pain and palpitations.   Gastrointestinal:  Negative for abdominal pain and vomiting.   Genitourinary:  Negative for dysuria and hematuria.   Musculoskeletal:  Negative for arthralgias and back pain.   Skin:  Negative for color change and rash.   Neurological:  Negative for seizures and syncope.   All other systems reviewed and are negative.      Objective:  /70 (BP Location: Left arm, Patient Position: Sitting, Cuff Size: Large)   Pulse 63   Ht 5' 3.75\" (1.619 m)   Wt 98 kg (216 lb)   SpO2 95%   BMI 37.37 kg/m²     Physical Exam  Constitutional:       General: She is not in acute distress.     Appearance: Normal appearance. She is obese. She is not ill-appearing, toxic-appearing or diaphoretic.   HENT:      Head: Normocephalic and atraumatic.   Pulmonary:      Effort: Pulmonary effort is normal.   Musculoskeletal:         General: Normal range of motion.      Cervical back: Normal range of motion.   Skin:     General: Skin is warm.   Neurological:      Mental Status: She is alert and oriented to person, place, and time.   Psychiatric:         Mood and Affect: Mood normal.         Behavior: Behavior " "normal.              Labs and Imaging  Recent labs and imaging have been personally reviewed.  Lab Results   Component Value Date    WBC 6.1 10/30/2024    HGB 13.3 10/30/2024    HCT 41.5 10/30/2024    MCV 87.7 10/30/2024     10/30/2024     Lab Results   Component Value Date     11/06/2017    SODIUM 140 10/30/2024    K 3.9 10/30/2024     10/30/2024    CO2 27 10/30/2024    AGAP 8 10/04/2019    BUN 15 10/30/2024    CREATININE 0.80 10/30/2024    GLUC 89 10/30/2024    GLUF 82 10/29/2018    CALCIUM 9.1 10/30/2024    AST 19 10/30/2024    ALT 17 10/30/2024    ALKPHOS 75 10/30/2024    PROT 7.2 11/06/2017    TP 7.1 10/30/2024    BILITOT 0.4 11/06/2017    TBILI 0.5 10/30/2024    EGFR 76 10/30/2024     No results found for: \"HGBA1C\"  Lab Results   Component Value Date    FJP2CTAYELUZ 2.550 10/29/2018    TSH 4.01 10/30/2024     Lab Results   Component Value Date    CHOLESTEROL 196 10/30/2024     Lab Results   Component Value Date    HDL 51 10/30/2024     Lab Results   Component Value Date    TRIG 96 10/30/2024     Lab Results   Component Value Date    LDLCALC 125 (H) 10/30/2024           Weight Management  Ryan Olson PA-C    "

## 2025-03-03 ENCOUNTER — CLINICAL SUPPORT (OUTPATIENT)
Dept: BARIATRICS | Facility: CLINIC | Age: 80
End: 2025-03-03

## 2025-03-03 VITALS — BODY MASS INDEX: 36.5 KG/M2 | WEIGHT: 213.8 LBS | HEIGHT: 64 IN

## 2025-03-03 DIAGNOSIS — R63.5 ABNORMAL WEIGHT GAIN: Primary | ICD-10-CM

## 2025-03-03 PROCEDURE — RECHECK

## 2025-03-03 PROCEDURE — WMPRO12

## 2025-03-03 NOTE — PROGRESS NOTES
Weight Management Medical Nutrition Assessment  Melissa was here today as a new start in the Healthy [facility] program.  (She completed the program in 2020)  She weighs 213.8 - she was able to keep 15 lbs off from when she started 5 yrs ago.  Her goal is to get under 200 lbs.  Her   has early onset Alzheimer's disease and it has been stressful for her at times.  She admits to emotional/stress eating.  Mentioned her  visit with Sarah - she was very receptive.    Reviewed her calorie carb and protein needs.  She is already journaling - provided her with journals to write in.  Reviewed my plate, and portions.  Discussed carb counting and protein needs.  Questions asked and answered.  She recently joined [facility] and is starting to go 3 times a week.       Patient seen by Medical Provider in past 6 months:  yes  Requested to schedule appointment with Medical Provider: No      Anthropometric Measurements  Start Weight (#) & Date: 213.8 (3/3/2025)  Current Weight (#): 213.8  TBW % Change from start weight:0%  Ideal Body Weight (#):118.75  Goal Weight (#):below 200 starting goal  Highest: 230  Lowest: 150 (in 1996)    Weight Loss History  Previous weight loss attempts: [facility] in 2020    Food and Nutrition Related History  Wake up: 7 am   Bed Time: 11:30    Food Recall  Breakfast:coffee, cereal or batista and eggs and roll    Snack:none  Lunch:fruit (light meal)  Snack:none  Dinner:salmon with beans and potatoes (meat, veg and starch)  Snack:none      Beverages: water, herbal decaf tea  Volume of beverage intake: 48 oz    Weekends: Same  Cravings: sweets  Trouble area of day:evenings    Frequency of Eating out: 2 - 3 times a week  Food restrictions:none  Cooking: self   Food Shopping: self    Physical Activity Intake  Activity:[employer]  Frequency: starting to go 3 times a week  Physical limitations/barriers to exercise: none    Estimated Needs  Energy    [employer] Needs: BMR : 1431   1-2# loss  weekly sedentary:  717 - 1217             1-2# loss weekly lightly active:967 - 1467  Maintenance calories for sedentary activity level: 1717  Protein:65 - 81      (1.2-1.5g/kg IBW)  Fluid Requirement Calculator   Total Fluid: 82     (Ovi-Segar Method)  Free Fluid: 20 (Ovi-Segar Method - 20%)    Nutrition Diagnosis  Yes;    Overweight/obesity  related to Excess energy intake as evidenced by  BMI more than normative standard for age and sex (obesity-grade II 35-39.9)       Nutrition Intervention    Nutrition Prescription  Calories:1000 - 1200  Protein:65 - 81  Fluid:82    Meal Plan (Colin/Pro/Carb)  Breakfast:  Snack:  Lunch:  Snack:  Dinner:  Snack:    Nutrition Education:    [facility] Manual  Calorie controlled menu  Lean protein food choices  Healthy snack options  Food journaling tips      Nutrition Counseling:  Strategies: meal planning, portion sizes, healthy snack choices, hydration, fiber intake, protein intake, exercise, food journal      Monitoring and Evaluation:  Evaluation criteria:  Energy Intake  Meet protein needs  Maintain adequate hydration  Monitor weekly weight  Meal planning/preparation  Food journal   Decreased portions at mealtimes and snacks  Physical activity     Barriers to learning:none  Readiness to change: Action:  (Changing behavior)  Comprehension: good  Expected Compliance: good

## 2025-03-12 ENCOUNTER — CLINICAL SUPPORT (OUTPATIENT)
Dept: BARIATRICS | Facility: CLINIC | Age: 80
End: 2025-03-12

## 2025-03-12 VITALS — BODY MASS INDEX: 36.43 KG/M2 | WEIGHT: 213.4 LBS | HEIGHT: 64 IN

## 2025-03-12 DIAGNOSIS — R63.5 ABNORMAL WEIGHT GAIN: Primary | ICD-10-CM

## 2025-03-12 PROCEDURE — RECHECK

## 2025-03-19 ENCOUNTER — CLINICAL SUPPORT (OUTPATIENT)
Dept: BARIATRICS | Facility: CLINIC | Age: 80
End: 2025-03-19

## 2025-03-19 VITALS — HEIGHT: 64 IN | BODY MASS INDEX: 36.23 KG/M2 | WEIGHT: 212.2 LBS

## 2025-03-19 DIAGNOSIS — R63.5 ABNORMAL WEIGHT GAIN: Primary | ICD-10-CM

## 2025-03-19 PROCEDURE — RECHECK

## 2025-03-26 ENCOUNTER — CLINICAL SUPPORT (OUTPATIENT)
Dept: BARIATRICS | Facility: CLINIC | Age: 80
End: 2025-03-26

## 2025-03-26 VITALS — HEIGHT: 64 IN | BODY MASS INDEX: 36 KG/M2 | WEIGHT: 210.9 LBS

## 2025-03-26 DIAGNOSIS — R63.5 ABNORMAL WEIGHT GAIN: Primary | ICD-10-CM

## 2025-03-26 PROCEDURE — RECHECK

## 2025-03-26 NOTE — PROGRESS NOTES
Weight Management Medical Nutrition Assessment  Melissa was here today for her follow-up  in the [facility] program.  She was supposed to have a body comp but was wearing compression stockings and we were unable to do the test today.  She weighs 210.0 giving her a loss of 2.9 lbs in the last 2 weeks.  She is continues journaling her food. She starting going with a walking group on Wednesdays and is walking more on her own as well.  She is not eating after 7 pm.      Patient seen by Medical Provider in past 6 months:  yes  Requested to schedule appointment with Medical Provider: No        Anthropometric Measurements  Start Weight (#) & Date: 213.8 (3/3/2025)  Current Weight (#): 210.9  TBW % Change from start weight:2%  Ideal Body Weight (#):118.75  Goal Weight (#):below 200 starting goal  Highest: 230  Lowest: 150 (in 1996)     Weight Loss History  Previous weight loss attempts: [facility] in 2020     Food and Nutrition Related History  Wake up: 7 am              Bed Time: 11:30     Food Recall  Breakfast:coffee, cereal or batista and eggs and roll                Snack:none  Lunch:fruit (light meal)  Snack:none  Dinner:salmon with beans and potatoes (meat, veg and starch)  Snack:none        Beverages: water, herbal decaf tea  Volume of beverage intake: 48 oz     Weekends: Same  Cravings: sweets  Trouble area of day:evenings     Frequency of Eating out: 2 - 3 times a week  Food restrictions:none  Cooking: self   Food Shopping: self     Physical Activity Intake  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  Frequency: starting to go 3 times a week  Physical limitations/barriers to exercise: none     Estimated Needs  Energy     [place] [address] [employer] Needs: BMR : 1431   1-2# loss weekly sedentary:  717 - 1217             1-2# loss weekly lightly active:967 - 1467  Maintenance calories for sedentary activity level: 1717  Protein:65 - 81      (1.2-1.5g/kg IBW)  Fluid Requirement Calculator   Total Fluid: 82     (Ovi-Segar Method)  Free  Fluid: 20 (Ovi-Eliza Method - 20%)     Nutrition Diagnosis  Yes;   Nutrition Diagnosis[]Expand by Default  Overweight/obesity  related to Excess energy intake as evidenced by  BMI more than normative standard for age and sex (obesity-grade II 35-39.9)     Nutrition Intervention     Nutrition Prescription  Calories:1000 - 1200  Protein:65 - 81  Fluid:82     Nutrition Education:    Healthy [facility] Manual  Calorie controlled menu  Lean protein food choices  Healthy snack options  Food journaling tips        Nutrition Counseling:  Strategies: meal planning, portion sizes, healthy snack choices, hydration, fiber intake, protein intake, exercise, food journal        Monitoring and Evaluation:  Evaluation criteria:  Energy Intake  Meet protein needs  Maintain adequate hydration  Monitor weekly weight  Meal planning/preparation  Food journal   Decreased portions at mealtimes and snacks  Physical activity      Barriers to learning:none  Readiness to change: Action:  (Changing behavior)  Comprehension: good  Expected Compliance: good

## 2025-03-27 VITALS — HEIGHT: 64 IN | BODY MASS INDEX: 36 KG/M2 | WEIGHT: 210.9 LBS

## 2025-04-09 ENCOUNTER — CLINICAL SUPPORT (OUTPATIENT)
Dept: BARIATRICS | Facility: CLINIC | Age: 80
End: 2025-04-09

## 2025-04-09 VITALS — BODY MASS INDEX: 36.5 KG/M2 | HEIGHT: 64 IN | WEIGHT: 213.8 LBS

## 2025-04-09 DIAGNOSIS — R63.5 ABNORMAL WEIGHT GAIN: Primary | ICD-10-CM

## 2025-04-09 PROCEDURE — RECHECK

## 2025-04-23 ENCOUNTER — CLINICAL SUPPORT (OUTPATIENT)
Dept: BARIATRICS | Facility: CLINIC | Age: 80
End: 2025-04-23

## 2025-04-23 VITALS — BODY MASS INDEX: 36.09 KG/M2 | HEIGHT: 64 IN | WEIGHT: 211.4 LBS

## 2025-04-23 DIAGNOSIS — R63.5 ABNORMAL WEIGHT GAIN: Primary | ICD-10-CM

## 2025-04-23 PROCEDURE — RECHECK

## 2025-04-30 ENCOUNTER — CLINICAL SUPPORT (OUTPATIENT)
Dept: BARIATRICS | Facility: CLINIC | Age: 80
End: 2025-04-30

## 2025-04-30 VITALS — WEIGHT: 210 LBS | BODY MASS INDEX: 35.85 KG/M2 | HEIGHT: 64 IN

## 2025-04-30 DIAGNOSIS — R63.5 ABNORMAL WEIGHT GAIN: Primary | ICD-10-CM

## 2025-04-30 PROCEDURE — RECHECK

## 2025-05-05 ENCOUNTER — OFFICE VISIT (OUTPATIENT)
Dept: FAMILY MEDICINE CLINIC | Facility: CLINIC | Age: 80
End: 2025-05-05
Payer: COMMERCIAL

## 2025-05-05 VITALS
TEMPERATURE: 98.2 F | RESPIRATION RATE: 16 BRPM | DIASTOLIC BLOOD PRESSURE: 80 MMHG | SYSTOLIC BLOOD PRESSURE: 120 MMHG | WEIGHT: 208.6 LBS | HEIGHT: 64 IN | OXYGEN SATURATION: 96 % | HEART RATE: 90 BPM | BODY MASS INDEX: 35.61 KG/M2

## 2025-05-05 DIAGNOSIS — L30.9 DERMATITIS: ICD-10-CM

## 2025-05-05 DIAGNOSIS — E78.2 MIXED HYPERLIPIDEMIA: ICD-10-CM

## 2025-05-05 DIAGNOSIS — D53.9 NUTRITIONAL ANEMIA, UNSPECIFIED: ICD-10-CM

## 2025-05-05 DIAGNOSIS — E53.9 VITAMIN B DEFICIENCY: ICD-10-CM

## 2025-05-05 DIAGNOSIS — E03.9 HYPOTHYROIDISM IN ADULT: Primary | ICD-10-CM

## 2025-05-05 DIAGNOSIS — E55.9 VITAMIN D DEFICIENCY: ICD-10-CM

## 2025-05-05 DIAGNOSIS — E66.812 OBESITY, CLASS II, BMI 35-39.9: ICD-10-CM

## 2025-05-05 PROCEDURE — 99214 OFFICE O/P EST MOD 30 MIN: CPT | Performed by: FAMILY MEDICINE

## 2025-05-05 PROCEDURE — G2211 COMPLEX E/M VISIT ADD ON: HCPCS | Performed by: FAMILY MEDICINE

## 2025-05-05 RX ORDER — PIMECROLIMUS 1 %
CREAM (GRAM) TOPICAL 2 TIMES DAILY
Qty: 30 G | Refills: 1 | Status: SHIPPED | OUTPATIENT
Start: 2025-05-05

## 2025-05-05 NOTE — PROGRESS NOTES
Name: Melissa Caban      : 1945      MRN: [id number]  Encounter Provider: Russ Solitario DO  Encounter Date: 2025   Encounter department: [facility] [address] [facility]  :  Assessment & Plan  Hypothyroidism in adult  Continue levothyroxine, check a TSH       Mixed hyperlipidemia  Stable, off medications, check a lipid profile with her next visit.       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  I did discuss with her that she can do this for up to 4 months.       Dermatitis    Orders:  •  Elidel 1 % cream; Apply topically 2 (two) times a day    Vitamin D deficiency    Orders:  •  Vitamin D 25 hydroxy; Future    Vitamin B deficiency         Nutritional anemia, unspecified        Orders:  •  Vitamin B12; Future      Assessment & Plan           History of Present Illness   Patient comes in today for checkup.  She states that she has been under more stress that she is caring for her .  She was not able to get her blood work done.      Review of Systems   Constitutional:  Negative for chills, fatigue and fever.   HENT:  Negative for congestion, ear pain, hearing loss, postnasal drip, rhinorrhea and sore throat.    Eyes:  Negative for pain and visual disturbance.   Respiratory:  Negative for chest tightness, shortness of breath and wheezing.    Cardiovascular:  Negative for chest pain and leg swelling.   Gastrointestinal:  Negative for abdominal distention, abdominal pain, constipation, diarrhea and vomiting.   Endocrine: Negative for cold intolerance and heat intolerance.   Genitourinary:  Negative for difficulty urinating, frequency and urgency.   Musculoskeletal:  Negative for arthralgias and gait problem.   Skin:  Negative for color change.   Neurological:  Negative for dizziness, tremors, syncope, numbness and headaches.   Hematological:  Negative for adenopathy.   Psychiatric/Behavioral:  Negative for agitation, confusion and sleep disturbance.  "The patient is not nervous/anxious.        Objective   /80 (BP Location: Left arm, Cuff Size: Large)   Pulse 90   Temp 98.2 °F (36.8 °C)   Resp 16   Ht 5' 3.75\" (1.619 m)   Wt 94.6 kg (208 lb 9.6 oz)   SpO2 96%   BMI 36.09 kg/m²      Physical Exam  Constitutional:       Appearance: She is well-developed.   HENT:      Head: Normocephalic and atraumatic.      Right Ear: External ear normal.      Left Ear: External ear normal.      Nose: Nose normal.      Mouth/Throat:      Pharynx: Oropharynx is clear.   Eyes:      Extraocular Movements: Extraocular movements intact.      Conjunctiva/sclera: Conjunctivae normal.      Pupils: Pupils are equal, round, and reactive to light.   Neck:      Thyroid: No thyromegaly.   Cardiovascular:      Rate and Rhythm: Normal rate and regular rhythm.      Heart sounds: Normal heart sounds. No murmur heard.  Pulmonary:      Effort: Pulmonary effort is normal.   Abdominal:      General: Bowel sounds are normal. There is no distension.      Palpations: Abdomen is soft.   Musculoskeletal:         General: Normal range of motion.      Cervical back: Normal range of motion and neck supple.   Skin:     General: Skin is warm.   Neurological:      Mental Status: She is alert and oriented to person, place, and time.   Psychiatric:         Mood and Affect: Mood normal.         "

## 2025-05-05 NOTE — ASSESSMENT & PLAN NOTE
Start phentermine 37.5 mg daily.  I did discuss with her that she can do this for up to 4 months.

## 2025-05-07 ENCOUNTER — CLINICAL SUPPORT (OUTPATIENT)
Dept: BARIATRICS | Facility: CLINIC | Age: 80
End: 2025-05-07

## 2025-05-07 VITALS — BODY MASS INDEX: 35.27 KG/M2 | HEIGHT: 64 IN | WEIGHT: 206.6 LBS

## 2025-05-07 DIAGNOSIS — R63.5 ABNORMAL WEIGHT GAIN: Primary | ICD-10-CM

## 2025-05-07 PROCEDURE — RECHECK

## 2025-05-08 ENCOUNTER — RESULTS FOLLOW-UP (OUTPATIENT)
Dept: FAMILY MEDICINE CLINIC | Facility: CLINIC | Age: 80
End: 2025-05-08

## 2025-05-08 LAB — TSH SERPL-ACNC: 2.97 MIU/L (ref 0.4–4.5)

## 2025-05-12 ENCOUNTER — CLINICAL SUPPORT (OUTPATIENT)
Dept: BARIATRICS | Facility: CLINIC | Age: 80
End: 2025-05-12

## 2025-05-12 VITALS — BODY MASS INDEX: 35.74 KG/M2 | HEIGHT: 64 IN

## 2025-05-12 DIAGNOSIS — R63.5 ABNORMAL WEIGHT GAIN: Primary | ICD-10-CM

## 2025-05-12 PROCEDURE — RECHECK

## 2025-05-12 NOTE — PROGRESS NOTES
Weight Management Medical Nutrition Assessment  Melissa was here today for her follow-up  in the [facility] program.  She did not want to get a weight today since she will be coming to class on Wednesday.    She is continues journaling her food. She starting going with a walking group on Wednesdays and is walking more on her own as well.  She is not eating after 7 pm. She started phentermine with no side effects.  She has been increaing water.      Patient seen by Medical Provider in past 6 months:  yes  Requested to schedule appointment with Medical Provider: No        Anthropometric Measurements  Start Weight (#) & Date: 213.8 (3/3/2025)  Current Weight (#): 210.9  TBW % Change from start weight:2%  Ideal Body Weight (#):118.75  Goal Weight (#):below 200 starting goal  Highest: 230  Lowest: 150 (in 1996)     Weight Loss History  Previous weight loss attempts: [facility] in 2020     Food and Nutrition Related History  Wake up: 7 am              Bed Time: 11:30     Food Recall  Breakfast:coffee, cereal or batista and eggs and roll                Snack:none  Lunch:fruit (light meal)  Snack:none  Dinner:salmon with beans and potatoes (meat, veg and starch)  Snack:none        Beverages: water, herbal decaf tea  Volume of beverage intake: 48 oz     Weekends: Same  Cravings: sweets  Trouble area of day:evenings     Frequency of Eating out: 2 - 3 times a week  Food restrictions:none  Cooking: self   Food Shopping: self     Physical Activity Intake  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  Frequency: starting to go 3 times a week  Physical limitations/barriers to exercise: none     Estimated Needs  Energy     [employer] Energy Needs: BMR : 1431   1-2# loss weekly sedentary:  717 - 1217             1-2# loss weekly lightly active:967 - 1467  Maintenance calories for sedentary activity level: 1717  Protein:65 - 81      (1.2-1.5g/kg IBW)  Fluid Requirement Calculator   Total Fluid: 82     (Ovi-Segar Method)  Free Fluid: 20  (Amanda Method - 20%)     Nutrition Diagnosis  Yes;   Nutrition Diagnosis[]Expand by Default  Overweight/obesity  related to Excess energy intake as evidenced by  BMI more than normative standard for age and sex (obesity-grade II 35-39.9)     Nutrition Intervention     Nutrition Prescription  Calories:1000 - 1200  Protein:65 - 81  Fluid:82     Nutrition Education:    Healthy Core Manual  Calorie controlled menu  Lean protein food choices  Healthy snack options  Food journaling tips        Nutrition Counseling:  Strategies: meal planning, portion sizes, healthy snack choices, hydration, fiber intake, protein intake, exercise, food journal        Monitoring and Evaluation:  Evaluation criteria:  Energy Intake  Meet protein needs  Maintain adequate hydration  Monitor weekly weight  Meal planning/preparation  Food journal   Decreased portions at mealtimes and snacks  Physical activity      Barriers to learning:none  Readiness to change: Action:  (Changing behavior)  Comprehension: good  Expected Compliance: good

## 2025-05-14 ENCOUNTER — CLINICAL SUPPORT (OUTPATIENT)
Dept: BARIATRICS | Facility: CLINIC | Age: 80
End: 2025-05-14

## 2025-05-14 VITALS — WEIGHT: 206.2 LBS | HEIGHT: 64 IN | BODY MASS INDEX: 35.2 KG/M2

## 2025-05-14 DIAGNOSIS — R63.5 ABNORMAL WEIGHT GAIN: Primary | ICD-10-CM

## 2025-05-14 PROCEDURE — RECHECK

## 2025-05-28 ENCOUNTER — CLINICAL SUPPORT (OUTPATIENT)
Dept: BARIATRICS | Facility: CLINIC | Age: 80
End: 2025-05-28

## 2025-05-28 VITALS — HEIGHT: 64 IN | BODY MASS INDEX: 35.2 KG/M2 | WEIGHT: 206.2 LBS

## 2025-05-28 DIAGNOSIS — R63.5 ABNORMAL WEIGHT GAIN: Primary | ICD-10-CM

## 2025-05-28 PROCEDURE — RECHECK

## 2025-05-30 ENCOUNTER — TELEPHONE (OUTPATIENT)
Age: 80
End: 2025-05-30

## 2025-05-30 NOTE — TELEPHONE ENCOUNTER
Pt called stating that received a msg regarding a bill for the last [facility] visit. Pt stated that she paid $100 during the last visit and this must be a misunderstanding. Please, advise.

## 2025-06-03 ENCOUNTER — TELEPHONE (OUTPATIENT)
Age: 80
End: 2025-06-03

## 2025-06-03 NOTE — TELEPHONE ENCOUNTER
Pt would like to come to the 7:30am class tomorrow instead of the 5:00.  I tried the office but they were busy.  I told pt I would have someone reach out to her and let her know

## 2025-06-04 ENCOUNTER — CLINICAL SUPPORT (OUTPATIENT)
Dept: BARIATRICS | Facility: CLINIC | Age: 80
End: 2025-06-04

## 2025-06-04 VITALS — BODY MASS INDEX: 34.76 KG/M2 | HEIGHT: 64 IN | WEIGHT: 203.6 LBS

## 2025-06-04 DIAGNOSIS — R63.5 ABNORMAL WEIGHT GAIN: Primary | ICD-10-CM

## 2025-06-04 PROCEDURE — RECHECK

## 2025-06-17 ENCOUNTER — CLINICAL SUPPORT (OUTPATIENT)
Dept: BARIATRICS | Facility: CLINIC | Age: 80
End: 2025-06-17

## 2025-06-17 VITALS — HEIGHT: 65 IN | WEIGHT: 204.2 LBS | BODY MASS INDEX: 34.02 KG/M2

## 2025-06-17 DIAGNOSIS — R63.5 ABNORMAL WEIGHT GAIN: Primary | ICD-10-CM

## 2025-06-17 PROCEDURE — RECHECK

## 2025-06-17 NOTE — PROGRESS NOTES
Weight Management Medical Nutrition Assessment  Melissa was here today for her month 3 follow-up  and body comp in the [facility] program.  Today she weighs 204.2 lbs giving her a loss of 6.7 lbs since her last visit 6 weeks ago.    She is continues journaling her food. She has been going with a walking group on Wednesdays and is walking more on her own as well.  She is not eating after 7 pm. She started phentermine with no side effects.  She has been increaing water.   Completed a body composition using SECA scale and reviewed results with patient.       Patient seen by Medical Provider in past 6 months:  yes  Requested to schedule appointment with Medical Provider: No        Anthropometric Measurements  Start Weight (#) & Date: 213.8 (3/3/2025)  Current Weight (#): 204.2  TBW % Change from start weight:5%  Ideal Body Weight (#):118.75  Goal Weight (#):below 200 starting goal  Highest: 230  Lowest: 150 (in 1996)     Weight Loss History  Previous weight loss attempts: [facility] in 2020     Food and Nutrition Related History  Wake up: 7 am              Bed Time: 11:30     Food Recall  Breakfast:coffee, cereal or batista and eggs and roll                Snack:none  Lunch:fruit (light meal)  Snack:none  Dinner:salmon with beans and potatoes (meat, veg and starch)  Snack:none        Beverages: water, herbal decaf tea  Volume of beverage intake: 48 oz     Weekends: Same  Cravings: sweets  Trouble area of day:evenings     Frequency of Eating out: 2 - 3 times a week  Food restrictions:none  Cooking: self   Food Shopping: self     Physical Activity Intake  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  Frequency: starting to go 3 times a week  Physical limitations/barriers to exercise: none     Estimated Needs  Energy     [employer] Energy Needs: BMR : 1407   1# loss weekly sedentary:  1189             1-2# loss weekly lightly active:935 - 1435  Maintenance calories for sedentary activity level: 1689  Protein:65 - 81      (1.2-1.5g/kg  IBW)  Fluid Requirement Calculator   Total Fluid: 99.83     (Ovi-Segar Method)  Free Fluid: 79.86 ([place]-Segar Method - 20%)     Nutrition Diagnosis  Yes;   Nutrition Diagnosis[]Expand by Default  Overweight/obesity  related to Excess energy intake as evidenced by  BMI more than normative standard for age and sex (obesity-grade II 35-39.9)     Nutrition Intervention     Nutrition Prescription  Calories:1000 - 1200  Protein:65 - 81  Fluid: 80     Nutrition Education:    Healthy [facility] Manual  Calorie controlled menu  Lean protein food choices  Healthy snack options  Food journaling tips        Nutrition Counseling:  Strategies: meal planning, portion sizes, healthy snack choices, hydration, fiber intake, protein intake, exercise, food journal        Monitoring and Evaluation:  Evaluation criteria:  Energy Intake  Meet protein needs  Maintain adequate hydration  Monitor weekly weight  Meal planning/preparation  Food journal   Decreased portions at mealtimes and snacks  Physical activity      Barriers to learning:none  Readiness to change: Action:  (Changing behavior)  Comprehension: good  Expected Compliance: good

## 2025-06-18 ENCOUNTER — CLINICAL SUPPORT (OUTPATIENT)
Dept: BARIATRICS | Facility: CLINIC | Age: 80
End: 2025-06-18

## 2025-06-18 VITALS — BODY MASS INDEX: 33.92 KG/M2 | WEIGHT: 203.6 LBS | HEIGHT: 65 IN

## 2025-06-18 DIAGNOSIS — R63.5 ABNORMAL WEIGHT GAIN: Primary | ICD-10-CM

## 2025-06-18 PROCEDURE — RECHECK

## 2025-06-25 ENCOUNTER — CLINICAL SUPPORT (OUTPATIENT)
Dept: BARIATRICS | Facility: CLINIC | Age: 80
End: 2025-06-25

## 2025-06-25 VITALS — HEIGHT: 65 IN | WEIGHT: 204 LBS | BODY MASS INDEX: 33.99 KG/M2

## 2025-06-25 DIAGNOSIS — R63.5 ABNORMAL WEIGHT GAIN: Primary | ICD-10-CM

## 2025-06-25 PROCEDURE — RECHECK

## 2025-07-19 DIAGNOSIS — L30.9 DERMATITIS: ICD-10-CM

## 2025-07-21 DIAGNOSIS — L30.9 DERMATITIS: Primary | ICD-10-CM

## 2025-07-21 RX ORDER — PIMECROLIMUS 10 MG/G
CREAM TOPICAL
Refills: 0 | OUTPATIENT
Start: 2025-07-21

## 2025-07-21 RX ORDER — TACROLIMUS 1 MG/G
OINTMENT TOPICAL 2 TIMES DAILY
Qty: 30 G | Refills: 1 | Status: SHIPPED | OUTPATIENT
Start: 2025-07-21

## 2025-08-22 RX ORDER — PHENTERMINE HYDROCHLORIDE 37.5 MG/1
37.5 TABLET ORAL DAILY
Qty: 30 TABLET | Refills: 0 | Status: SHIPPED | OUTPATIENT
Start: 2025-08-22